# Patient Record
Sex: MALE | Race: WHITE | NOT HISPANIC OR LATINO | Employment: OTHER | URBAN - METROPOLITAN AREA
[De-identification: names, ages, dates, MRNs, and addresses within clinical notes are randomized per-mention and may not be internally consistent; named-entity substitution may affect disease eponyms.]

---

## 2017-07-28 ENCOUNTER — OFFICE VISIT (OUTPATIENT)
Dept: FAMILY MEDICINE CLINIC | Facility: CLINIC | Age: 77
End: 2017-07-28

## 2017-07-28 VITALS
DIASTOLIC BLOOD PRESSURE: 84 MMHG | OXYGEN SATURATION: 98 % | RESPIRATION RATE: 16 BRPM | WEIGHT: 187.5 LBS | SYSTOLIC BLOOD PRESSURE: 148 MMHG | HEART RATE: 100 BPM | BODY MASS INDEX: 26.25 KG/M2 | HEIGHT: 71 IN | TEMPERATURE: 98.3 F

## 2017-07-28 DIAGNOSIS — E53.8 VITAMIN B12 DEFICIENCY: ICD-10-CM

## 2017-07-28 DIAGNOSIS — I10 ESSENTIAL HYPERTENSION: Primary | ICD-10-CM

## 2017-07-28 DIAGNOSIS — E55.9 VITAMIN D DEFICIENCY: ICD-10-CM

## 2017-07-28 DIAGNOSIS — E78.00 PURE HYPERCHOLESTEROLEMIA: ICD-10-CM

## 2017-07-28 DIAGNOSIS — K21.9 GASTROESOPHAGEAL REFLUX DISEASE, ESOPHAGITIS PRESENCE NOT SPECIFIED: ICD-10-CM

## 2017-07-28 PROCEDURE — 99214 OFFICE O/P EST MOD 30 MIN: CPT | Performed by: INTERNAL MEDICINE

## 2017-07-28 RX ORDER — LISINOPRIL 20 MG/1
20 TABLET ORAL DAILY
Qty: 90 TABLET | Refills: 3 | Status: SHIPPED | OUTPATIENT
Start: 2017-07-28 | End: 2018-02-06 | Stop reason: SDUPTHER

## 2017-07-28 RX ORDER — UBIDECARENONE 75 MG
50 CAPSULE ORAL DAILY
COMMUNITY
End: 2018-10-16

## 2017-07-28 RX ORDER — SIMVASTATIN 20 MG
20 TABLET ORAL NIGHTLY
Qty: 90 TABLET | Refills: 3 | Status: SHIPPED | OUTPATIENT
Start: 2017-07-28 | End: 2018-10-16 | Stop reason: SDUPTHER

## 2017-07-28 NOTE — PROGRESS NOTES
"Subjective   Patient ID: Arjun Plaza is a 77 y.o. male presents with   Chief Complaint   Patient presents with   • Establish Care   • Sore Throat       HPI - This patient's here today to establish care.  He has a past history hypertension hyperlipidemia vitamin B12 deficiency.  Overall he's feeling well and just wishes to establish care he needs refills and blood work.  He's caught up on routine screenings such as colonoscopy he no longer does a PSA.    Assessment plan    Hypertension controlled with lisinopril 20 we'll get a CMP    Hyperlipidemia simvastatin 20 we'll get a fasting lipid profile    Mild fatigue B12 and vitamin D D deficiencies we'll check a CBC and thyroid B12 and D    Allergies   Allergen Reactions   • Diclofenac        The following portions of the patient's history were reviewed and updated as appropriate: allergies, current medications, past family history, past medical history, past social history, past surgical history and problem list.      Review of Systems   Constitutional: Positive for fatigue.   HENT: Negative.    Eyes: Negative.    Respiratory: Negative.    Cardiovascular: Negative.    Gastrointestinal: Negative.    Endocrine: Negative.    Genitourinary: Negative.    Musculoskeletal: Negative.    Skin: Negative.    Allergic/Immunologic: Negative.    Neurological: Negative.    Hematological: Negative.    Psychiatric/Behavioral: Negative.        Objective     Vitals:    07/28/17 1044   BP: 148/84   Pulse: 100   Resp: 16   Temp: 98.3 °F (36.8 °C)   TempSrc: Oral   SpO2: 98%   Weight: 187 lb 8 oz (85 kg)   Height: 71\" (180.3 cm)         Physical Exam   Constitutional: He is oriented to person, place, and time. He appears well-developed and well-nourished. No distress.   HENT:   Head: Normocephalic and atraumatic.   Eyes: Conjunctivae and EOM are normal. Pupils are equal, round, and reactive to light. Right eye exhibits no discharge. Left eye exhibits no discharge. No scleral icterus. "   Neck: Normal range of motion. Neck supple. No tracheal deviation present. No thyromegaly present.   Cardiovascular: Normal rate, regular rhythm, normal heart sounds and normal pulses.  Exam reveals no gallop.    No murmur heard.  Pulmonary/Chest: Effort normal and breath sounds normal. No respiratory distress. He has no wheezes. He has no rales.   Musculoskeletal: Normal range of motion.   Neurological: He is alert and oriented to person, place, and time.   Skin: Skin is warm. No rash noted. No erythema. No pallor.   Psychiatric: He has a normal mood and affect. His behavior is normal. Judgment and thought content normal.   Nursing note and vitals reviewed.        Arjun was seen today for establish care and sore throat.    Diagnoses and all orders for this visit:    Essential hypertension  -     Lipid Panel  -     CBC & Differential  -     Comprehensive Metabolic Panel  -     TSH+Free T4  -     Vitamin D 25 Hydroxy  -     Vitamin B12    Pure hypercholesterolemia  -     Lipid Panel  -     CBC & Differential  -     Comprehensive Metabolic Panel  -     TSH+Free T4  -     Vitamin D 25 Hydroxy  -     Vitamin B12    Vitamin B12 deficiency  -     Lipid Panel  -     CBC & Differential  -     Comprehensive Metabolic Panel  -     TSH+Free T4  -     Vitamin D 25 Hydroxy  -     Vitamin B12    Vitamin D deficiency  -     Lipid Panel  -     CBC & Differential  -     Comprehensive Metabolic Panel  -     TSH+Free T4  -     Vitamin D 25 Hydroxy  -     Vitamin B12    Gastroesophageal reflux disease, esophagitis presence not specified  -     Lipid Panel  -     CBC & Differential  -     Comprehensive Metabolic Panel  -     TSH+Free T4  -     Vitamin D 25 Hydroxy  -     Vitamin B12    Other orders  -     lisinopril (PRINIVIL,ZESTRIL) 20 MG tablet; Take 1 tablet by mouth Daily.  -     simvastatin (ZOCOR) 20 MG tablet; Take 1 tablet by mouth Every Night.        Call or return to clinic prn if these symptoms worsen or fail to improve as  anticipated.

## 2017-07-29 LAB
25(OH)D3+25(OH)D2 SERPL-MCNC: 55.4 NG/ML (ref 30–100)
ALBUMIN SERPL-MCNC: 4.6 G/DL (ref 3.5–5.2)
ALBUMIN/GLOB SERPL: 1.7 G/DL
ALP SERPL-CCNC: 71 U/L (ref 39–117)
ALT SERPL-CCNC: 17 U/L (ref 1–41)
AST SERPL-CCNC: 17 U/L (ref 1–40)
BASOPHILS # BLD AUTO: 0.02 10*3/MM3 (ref 0–0.2)
BASOPHILS NFR BLD AUTO: 0.3 % (ref 0–1.5)
BILIRUB SERPL-MCNC: 0.4 MG/DL (ref 0.1–1.2)
BUN SERPL-MCNC: 20 MG/DL (ref 8–23)
BUN/CREAT SERPL: 16.7 (ref 7–25)
CALCIUM SERPL-MCNC: 9.3 MG/DL (ref 8.6–10.5)
CHLORIDE SERPL-SCNC: 101 MMOL/L (ref 98–107)
CHOLEST SERPL-MCNC: 193 MG/DL (ref 0–200)
CO2 SERPL-SCNC: 26.8 MMOL/L (ref 22–29)
CREAT SERPL-MCNC: 1.2 MG/DL (ref 0.76–1.27)
EOSINOPHIL # BLD AUTO: 0.28 10*3/MM3 (ref 0–0.7)
EOSINOPHIL NFR BLD AUTO: 3.9 % (ref 0.3–6.2)
ERYTHROCYTE [DISTWIDTH] IN BLOOD BY AUTOMATED COUNT: 13.1 % (ref 11.5–14.5)
GLOBULIN SER CALC-MCNC: 2.7 GM/DL
GLUCOSE SERPL-MCNC: 91 MG/DL (ref 65–99)
HCT VFR BLD AUTO: 48.6 % (ref 40.4–52.2)
HDLC SERPL-MCNC: 30 MG/DL (ref 40–60)
HGB BLD-MCNC: 14.9 G/DL (ref 13.7–17.6)
IMM GRANULOCYTES # BLD: 0.02 10*3/MM3 (ref 0–0.03)
IMM GRANULOCYTES NFR BLD: 0.3 % (ref 0–0.5)
LDLC SERPL CALC-MCNC: 116 MG/DL (ref 0–100)
LYMPHOCYTES # BLD AUTO: 2.05 10*3/MM3 (ref 0.9–4.8)
LYMPHOCYTES NFR BLD AUTO: 28.6 % (ref 19.6–45.3)
MCH RBC QN AUTO: 29.2 PG (ref 27–32.7)
MCHC RBC AUTO-ENTMCNC: 30.7 G/DL (ref 32.6–36.4)
MCV RBC AUTO: 95.1 FL (ref 79.8–96.2)
MONOCYTES # BLD AUTO: 0.76 10*3/MM3 (ref 0.2–1.2)
MONOCYTES NFR BLD AUTO: 10.6 % (ref 5–12)
NEUTROPHILS # BLD AUTO: 4.03 10*3/MM3 (ref 1.9–8.1)
NEUTROPHILS NFR BLD AUTO: 56.3 % (ref 42.7–76)
PLATELET # BLD AUTO: 262 10*3/MM3 (ref 140–500)
POTASSIUM SERPL-SCNC: 5.6 MMOL/L (ref 3.5–5.2)
PROT SERPL-MCNC: 7.3 G/DL (ref 6–8.5)
RBC # BLD AUTO: 5.11 10*6/MM3 (ref 4.6–6)
SODIUM SERPL-SCNC: 141 MMOL/L (ref 136–145)
T4 FREE SERPL-MCNC: 1.47 NG/DL (ref 0.93–1.7)
TRIGL SERPL-MCNC: 236 MG/DL (ref 0–150)
TSH SERPL DL<=0.005 MIU/L-ACNC: 1.32 MIU/ML (ref 0.27–4.2)
VIT B12 SERPL-MCNC: 1888 PG/ML (ref 211–946)
VLDLC SERPL CALC-MCNC: 47.2 MG/DL (ref 5–40)
WBC # BLD AUTO: 7.16 10*3/MM3 (ref 4.5–10.7)

## 2017-07-31 DIAGNOSIS — E87.5 HIGH POTASSIUM: Primary | ICD-10-CM

## 2017-07-31 NOTE — PROGRESS NOTES
Pt informed of lab results, per his request I have mailed him these results. Labs in the computer and lab only appt has been made.

## 2017-08-15 ENCOUNTER — RESULTS ENCOUNTER (OUTPATIENT)
Dept: FAMILY MEDICINE CLINIC | Facility: CLINIC | Age: 77
End: 2017-08-15

## 2017-08-15 DIAGNOSIS — E87.5 HIGH POTASSIUM: ICD-10-CM

## 2017-08-15 LAB
BUN SERPL-MCNC: 17 MG/DL (ref 8–23)
BUN/CREAT SERPL: 14.8 (ref 7–25)
CALCIUM SERPL-MCNC: 9.6 MG/DL (ref 8.6–10.5)
CHLORIDE SERPL-SCNC: 101 MMOL/L (ref 98–107)
CO2 SERPL-SCNC: 29.3 MMOL/L (ref 22–29)
CREAT SERPL-MCNC: 1.15 MG/DL (ref 0.76–1.27)
GLUCOSE SERPL-MCNC: 103 MG/DL (ref 65–99)
POTASSIUM SERPL-SCNC: 4.6 MMOL/L (ref 3.5–5.2)
SODIUM SERPL-SCNC: 143 MMOL/L (ref 136–145)

## 2018-01-26 DIAGNOSIS — I15.9 SECONDARY HYPERTENSION: ICD-10-CM

## 2018-01-26 DIAGNOSIS — E53.8 VITAMIN B 12 DEFICIENCY: ICD-10-CM

## 2018-01-26 DIAGNOSIS — E55.9 VITAMIN D DEFICIENCY: ICD-10-CM

## 2018-01-26 DIAGNOSIS — E78.5 HYPERLIPIDEMIA, UNSPECIFIED HYPERLIPIDEMIA TYPE: Primary | ICD-10-CM

## 2018-01-30 ENCOUNTER — RESULTS ENCOUNTER (OUTPATIENT)
Dept: FAMILY MEDICINE CLINIC | Facility: CLINIC | Age: 78
End: 2018-01-30

## 2018-01-30 DIAGNOSIS — I15.9 SECONDARY HYPERTENSION: ICD-10-CM

## 2018-01-30 DIAGNOSIS — E78.5 HYPERLIPIDEMIA, UNSPECIFIED HYPERLIPIDEMIA TYPE: ICD-10-CM

## 2018-01-30 DIAGNOSIS — E55.9 VITAMIN D DEFICIENCY: ICD-10-CM

## 2018-01-30 DIAGNOSIS — E53.8 VITAMIN B 12 DEFICIENCY: ICD-10-CM

## 2018-01-30 LAB
25(OH)D3+25(OH)D2 SERPL-MCNC: 47 NG/ML (ref 30–100)
ALBUMIN SERPL-MCNC: 4.7 G/DL (ref 3.5–5.2)
ALBUMIN/GLOB SERPL: 2 G/DL
ALP SERPL-CCNC: 57 U/L (ref 39–117)
ALT SERPL-CCNC: 15 U/L (ref 1–41)
AST SERPL-CCNC: 16 U/L (ref 1–40)
BASOPHILS # BLD AUTO: 0.02 10*3/MM3 (ref 0–0.2)
BASOPHILS NFR BLD AUTO: 0.3 % (ref 0–1.5)
BILIRUB SERPL-MCNC: 0.4 MG/DL (ref 0.1–1.2)
BUN SERPL-MCNC: 33 MG/DL (ref 8–23)
BUN/CREAT SERPL: 25.6 (ref 7–25)
CALCIUM SERPL-MCNC: 9.1 MG/DL (ref 8.6–10.5)
CHLORIDE SERPL-SCNC: 99 MMOL/L (ref 98–107)
CHOLEST SERPL-MCNC: 173 MG/DL (ref 0–200)
CO2 SERPL-SCNC: 27.9 MMOL/L (ref 22–29)
CREAT SERPL-MCNC: 1.29 MG/DL (ref 0.76–1.27)
EOSINOPHIL # BLD AUTO: 0.08 10*3/MM3 (ref 0–0.7)
EOSINOPHIL NFR BLD AUTO: 1.2 % (ref 0.3–6.2)
ERYTHROCYTE [DISTWIDTH] IN BLOOD BY AUTOMATED COUNT: 13.3 % (ref 11.5–14.5)
GFR SERPLBLD CREATININE-BSD FMLA CKD-EPI: 54 ML/MIN/1.73
GFR SERPLBLD CREATININE-BSD FMLA CKD-EPI: 65 ML/MIN/1.73
GLOBULIN SER CALC-MCNC: 2.4 GM/DL
GLUCOSE SERPL-MCNC: 100 MG/DL (ref 65–99)
HCT VFR BLD AUTO: 45.6 % (ref 40.4–52.2)
HDLC SERPL-MCNC: 41 MG/DL (ref 40–60)
HGB BLD-MCNC: 14.4 G/DL (ref 13.7–17.6)
IMM GRANULOCYTES # BLD: 0.02 10*3/MM3 (ref 0–0.03)
IMM GRANULOCYTES NFR BLD: 0.3 % (ref 0–0.5)
LDLC SERPL CALC-MCNC: 111 MG/DL (ref 0–100)
LDLC/HDLC SERPL: 2.71 {RATIO}
LYMPHOCYTES # BLD AUTO: 2.02 10*3/MM3 (ref 0.9–4.8)
LYMPHOCYTES NFR BLD AUTO: 30.7 % (ref 19.6–45.3)
MCH RBC QN AUTO: 29.8 PG (ref 27–32.7)
MCHC RBC AUTO-ENTMCNC: 31.6 G/DL (ref 32.6–36.4)
MCV RBC AUTO: 94.2 FL (ref 79.8–96.2)
MONOCYTES # BLD AUTO: 0.66 10*3/MM3 (ref 0.2–1.2)
MONOCYTES NFR BLD AUTO: 10 % (ref 5–12)
NEUTROPHILS # BLD AUTO: 3.77 10*3/MM3 (ref 1.9–8.1)
NEUTROPHILS NFR BLD AUTO: 57.5 % (ref 42.7–76)
PLATELET # BLD AUTO: 212 10*3/MM3 (ref 140–500)
POTASSIUM SERPL-SCNC: 4.6 MMOL/L (ref 3.5–5.2)
PROT SERPL-MCNC: 7.1 G/DL (ref 6–8.5)
RBC # BLD AUTO: 4.84 10*6/MM3 (ref 4.6–6)
SODIUM SERPL-SCNC: 140 MMOL/L (ref 136–145)
TRIGL SERPL-MCNC: 104 MG/DL (ref 0–150)
VIT B12 SERPL-MCNC: 737 PG/ML (ref 211–946)
VLDLC SERPL CALC-MCNC: 20.8 MG/DL (ref 5–40)
WBC # BLD AUTO: 6.57 10*3/MM3 (ref 4.5–10.7)

## 2018-02-06 ENCOUNTER — OFFICE VISIT (OUTPATIENT)
Dept: FAMILY MEDICINE CLINIC | Facility: CLINIC | Age: 78
End: 2018-02-06

## 2018-02-06 VITALS
OXYGEN SATURATION: 98 % | SYSTOLIC BLOOD PRESSURE: 130 MMHG | RESPIRATION RATE: 16 BRPM | HEIGHT: 71 IN | HEART RATE: 78 BPM | DIASTOLIC BLOOD PRESSURE: 80 MMHG | TEMPERATURE: 98.2 F | WEIGHT: 190 LBS | BODY MASS INDEX: 26.6 KG/M2

## 2018-02-06 DIAGNOSIS — E55.9 VITAMIN D DEFICIENCY: ICD-10-CM

## 2018-02-06 DIAGNOSIS — R39.12 WEAK URINARY STREAM: ICD-10-CM

## 2018-02-06 DIAGNOSIS — E53.8 VITAMIN B12 DEFICIENCY: ICD-10-CM

## 2018-02-06 DIAGNOSIS — I10 ESSENTIAL HYPERTENSION: Primary | ICD-10-CM

## 2018-02-06 DIAGNOSIS — E78.00 PURE HYPERCHOLESTEROLEMIA: ICD-10-CM

## 2018-02-06 PROBLEM — N40.0 BENIGN PROSTATIC HYPERPLASIA WITHOUT LOWER URINARY TRACT SYMPTOMS: Status: ACTIVE | Noted: 2018-02-06

## 2018-02-06 LAB
SPECIMEN STATUS: NORMAL
WRITTEN AUTHORIZATION: NORMAL

## 2018-02-06 PROCEDURE — 99214 OFFICE O/P EST MOD 30 MIN: CPT | Performed by: INTERNAL MEDICINE

## 2018-02-06 RX ORDER — LISINOPRIL 20 MG/1
20 TABLET ORAL DAILY
Qty: 90 TABLET | Refills: 1 | Status: SHIPPED | OUTPATIENT
Start: 2018-02-06 | End: 2018-10-16 | Stop reason: SDUPTHER

## 2018-02-06 NOTE — PROGRESS NOTES
"Subjective   Patient ID: Arjun Plaza is a 78 y.o. male presents with   Chief Complaint   Patient presents with   • Hypertension     f/u       HPI - This patient presents today for follow-up for treatment of hypertension, hypercholesterolemia and vitamin deficiencies and a weak urinary stream.  Overall he feels good he has some common aches and pains but nothing out of the ordinary.    Assessment plan    Essential hypertension controlled with lisinopril 20 reviewed his labs labs look good    Hyperlipidemia simvastatin 20 last rales well controlled he also takes fish oil    Vitamin deficiencies continue vitamin B12 and D    Weak urinary stream we will add a PSA.    Allergies   Allergen Reactions   • Diclofenac        The following portions of the patient's history were reviewed and updated as appropriate: allergies, current medications, past family history, past medical history, past social history, past surgical history and problem list.      Review of Systems   Constitutional: Negative.    HENT: Negative.    Eyes: Negative.    Respiratory: Negative.    Cardiovascular: Negative.    Gastrointestinal: Negative.    Endocrine: Negative.    Genitourinary: Negative.    Musculoskeletal: Positive for arthralgias.   Skin: Negative.    Allergic/Immunologic: Negative.    Neurological: Negative.    Hematological: Negative.    Psychiatric/Behavioral: Negative.        Objective     Vitals:    02/06/18 0942   BP: 130/80   Pulse: 78   Resp: 16   Temp: 98.2 °F (36.8 °C)   TempSrc: Oral   SpO2: 98%   Weight: 86.2 kg (190 lb)   Height: 180.3 cm (71\")         Physical Exam   Constitutional: He is oriented to person, place, and time. He appears well-developed and well-nourished.   HENT:   Head: Normocephalic and atraumatic.   Eyes: EOM are normal. Pupils are equal, round, and reactive to light.   Cardiovascular: Normal rate, regular rhythm and normal heart sounds.    Pulmonary/Chest: Effort normal and breath sounds normal. "   Neurological: He is alert and oriented to person, place, and time.   Psychiatric: He has a normal mood and affect. His behavior is normal.   Nursing note and vitals reviewed.        Arjun was seen today for hypertension.    Diagnoses and all orders for this visit:    Essential hypertension    Pure hypercholesterolemia    Vitamin B12 deficiency    Vitamin D deficiency    Weak urinary stream        Call or return to clinic prn if these symptoms worsen or fail to improve as anticipated.

## 2018-09-11 ENCOUNTER — HOSPITAL ENCOUNTER (EMERGENCY)
Facility: HOSPITAL | Age: 78
Discharge: HOME | End: 2018-09-11
Attending: EMERGENCY MEDICINE | Admitting: EMERGENCY MEDICINE

## 2018-09-11 VITALS
HEIGHT: 70 IN | WEIGHT: 180 LBS | BODY MASS INDEX: 25.77 KG/M2 | OXYGEN SATURATION: 95 % | TEMPERATURE: 97.8 F | HEART RATE: 67 BPM | SYSTOLIC BLOOD PRESSURE: 150 MMHG | DIASTOLIC BLOOD PRESSURE: 84 MMHG | RESPIRATION RATE: 14 BRPM

## 2018-09-11 DIAGNOSIS — R10.9 ABDOMINAL CRAMPS: ICD-10-CM

## 2018-09-11 DIAGNOSIS — K92.1 BLOOD IN STOOL: ICD-10-CM

## 2018-09-11 DIAGNOSIS — R19.7 DIARRHEA, UNSPECIFIED TYPE: Primary | ICD-10-CM

## 2018-09-11 LAB
ALBUMIN SERPL-MCNC: 4 G/DL (ref 3.5–5.2)
ALBUMIN/GLOB SERPL: 1.5 G/DL
ALP SERPL-CCNC: 71 U/L (ref 40–129)
ALT SERPL W P-5'-P-CCNC: 17 U/L (ref 5–41)
ANION GAP SERPL CALCULATED.3IONS-SCNC: 9.7 MMOL/L
APTT PPP: 30.2 SECONDS (ref 24.3–38.1)
AST SERPL-CCNC: 17 U/L (ref 5–40)
BASOPHILS # BLD AUTO: 0.04 10*3/MM3 (ref 0–0.2)
BASOPHILS NFR BLD AUTO: 0.3 % (ref 0–2)
BILIRUB SERPL-MCNC: 0.5 MG/DL (ref 0.2–1.2)
BUN BLD-MCNC: 24 MG/DL (ref 8–23)
BUN/CREAT SERPL: 22 (ref 7–25)
CALCIUM SPEC-SCNC: 8.8 MG/DL (ref 8.8–10.5)
CHLORIDE SERPL-SCNC: 101 MMOL/L (ref 98–107)
CO2 SERPL-SCNC: 27.3 MMOL/L (ref 22–29)
CREAT BLD-MCNC: 1.09 MG/DL (ref 0.76–1.27)
DEPRECATED RDW RBC AUTO: 42.3 FL (ref 37–54)
EOSINOPHIL # BLD AUTO: 0.22 10*3/MM3 (ref 0.1–0.3)
EOSINOPHIL NFR BLD AUTO: 1.6 % (ref 0–4)
ERYTHROCYTE [DISTWIDTH] IN BLOOD BY AUTOMATED COUNT: 13 % (ref 11.5–14.5)
GFR SERPL CREATININE-BSD FRML MDRD: 66 ML/MIN/1.73
GLOBULIN UR ELPH-MCNC: 2.7 GM/DL
GLUCOSE BLD-MCNC: 120 MG/DL (ref 65–99)
HCT VFR BLD AUTO: 46.6 % (ref 42–52)
HGB BLD-MCNC: 15.4 G/DL (ref 14–18)
IMM GRANULOCYTES # BLD: 0.12 10*3/MM3 (ref 0–0.03)
IMM GRANULOCYTES NFR BLD: 0.9 % (ref 0–0.5)
INR PPP: 1.14 (ref 0.9–1.1)
LYMPHOCYTES # BLD AUTO: 2.31 10*3/MM3 (ref 0.6–4.8)
LYMPHOCYTES NFR BLD AUTO: 17.2 % (ref 20–45)
MCH RBC QN AUTO: 29.7 PG (ref 27–31)
MCHC RBC AUTO-ENTMCNC: 33 G/DL (ref 31–37)
MCV RBC AUTO: 89.8 FL (ref 80–94)
MONOCYTES # BLD AUTO: 0.95 10*3/MM3 (ref 0–1)
MONOCYTES NFR BLD AUTO: 7.1 % (ref 3–8)
NEUTROPHILS # BLD AUTO: 9.79 10*3/MM3 (ref 1.5–8.3)
NEUTROPHILS NFR BLD AUTO: 72.9 % (ref 45–70)
NRBC BLD MANUAL-RTO: 0 /100 WBC (ref 0–0)
PLATELET # BLD AUTO: 243 10*3/MM3 (ref 140–500)
PMV BLD AUTO: 9.1 FL (ref 7.4–10.4)
POTASSIUM BLD-SCNC: 5.1 MMOL/L (ref 3.5–5.2)
PROT SERPL-MCNC: 6.7 G/DL (ref 6–8.5)
PROTHROMBIN TIME: 14.7 SECONDS (ref 12.1–15)
RBC # BLD AUTO: 5.19 10*6/MM3 (ref 4.7–6.1)
SODIUM BLD-SCNC: 138 MMOL/L (ref 136–145)
WBC NRBC COR # BLD: 13.43 10*3/MM3 (ref 4.8–10.8)

## 2018-09-11 PROCEDURE — 85610 PROTHROMBIN TIME: CPT | Performed by: EMERGENCY MEDICINE

## 2018-09-11 PROCEDURE — 99283 EMERGENCY DEPT VISIT LOW MDM: CPT

## 2018-09-11 PROCEDURE — 85025 COMPLETE CBC W/AUTO DIFF WBC: CPT | Performed by: EMERGENCY MEDICINE

## 2018-09-11 PROCEDURE — 99284 EMERGENCY DEPT VISIT MOD MDM: CPT | Performed by: EMERGENCY MEDICINE

## 2018-09-11 PROCEDURE — 85730 THROMBOPLASTIN TIME PARTIAL: CPT | Performed by: EMERGENCY MEDICINE

## 2018-09-11 PROCEDURE — 80053 COMPREHEN METABOLIC PANEL: CPT | Performed by: EMERGENCY MEDICINE

## 2018-09-11 PROCEDURE — 96360 HYDRATION IV INFUSION INIT: CPT

## 2018-09-11 RX ORDER — CEFUROXIME AXETIL 500 MG/1
500 TABLET ORAL 2 TIMES DAILY
COMMUNITY
End: 2018-10-16

## 2018-09-11 RX ORDER — LOPERAMIDE HYDROCHLORIDE 2 MG/1
4 CAPSULE ORAL ONCE
Status: COMPLETED | OUTPATIENT
Start: 2018-09-11 | End: 2018-09-11

## 2018-09-11 RX ORDER — SODIUM CHLORIDE 0.9 % (FLUSH) 0.9 %
10 SYRINGE (ML) INJECTION AS NEEDED
Status: DISCONTINUED | OUTPATIENT
Start: 2018-09-11 | End: 2018-09-11 | Stop reason: HOSPADM

## 2018-09-11 RX ADMIN — LOPERAMIDE HYDROCHLORIDE 4 MG: 2 CAPSULE ORAL at 04:00

## 2018-09-11 RX ADMIN — SODIUM CHLORIDE 1000 ML: 9 INJECTION, SOLUTION INTRAVENOUS at 04:00

## 2018-09-11 NOTE — ED PROVIDER NOTES
"Subjective   History of Present Illness  History of Present Illness    Chief complaint: Diarrhea, blood in stool    Location: GI    Quality/Severity:  Moderate symptoms    Timing/Duration: Began around 6 PM, continued through the evening    Modifying Factors: None    Narrative: This patient presents for evaluation of new onset diarrhea with blood in the stools.  He says that he and his wife had supper around 4 PM.  He ate some chicken noodle Soup and a salad.  His wife had the same chicken noodle soup but she did not have the salad.  Then, about 2 hours later he developed some severe abdominal cramping feeling and the urgent need to move his bowels.  When he went to the bathroom, sitting on the commode, he felt very hot and sweaty and eventually had some significant diarrheal stools.  There was no blood in that initial episode.  He did not have any nausea or vomiting.  After he had finished moving his bowels, he was able to get up and rest for a while.  Then a couple of hours later, he had another episode with more abdominal cramping and some diarrhea but this concerned him because when he wiped he saw some blood on the toilet paper and in the commode with the loose stool.  Additionally, he said it felt very painful in the rectum area when he was passing the diarrheal stool.  He said \"it felt like I was passing glass.\"  Then, once more around 3:00 this morning he had another episode with some blood in the diarrheal stools again.  He continues having some mild abdominal crampy pains throughout his abdomen.  He still has not had any vomiting.  He denies any fevers.  His wife has been feeling well without any symptoms tonight.  There are no other known sick contacts.  The patient takes an aspirin tablet regularly but no other anticoagulation medicines.  He took some Kaopectate tablets earlier at home but they have not relieved any of his symptoms so far.    Associated Symptoms: As above    Review of Systems "   Constitutional: Negative for activity change, fatigue and fever.   HENT: Negative.    Respiratory: Negative for cough and shortness of breath.    Cardiovascular: Negative for chest pain.   Gastrointestinal: Positive for abdominal pain, blood in stool, diarrhea and rectal pain. Negative for vomiting.   Genitourinary: Negative for dysuria, flank pain, frequency and hematuria.   Musculoskeletal: Negative for myalgias.   Skin: Negative for color change and rash.   Neurological: Negative for syncope and headaches.   All other systems reviewed and are negative.      Past Medical History:   Diagnosis Date   • Hyperlipidemia    • Hypertension        Allergies   Allergen Reactions   • Diclofenac        Past Surgical History:   Procedure Laterality Date   • FOREARM SURGERY     • HAND SURGERY Right        Family History   Problem Relation Age of Onset   • Hypertension Father    • Diabetes Father    • Diabetes Sister        Social History     Social History   • Marital status: Single     Social History Main Topics   • Smoking status: Never Smoker   • Smokeless tobacco: Never Used   • Alcohol use No   • Drug use: No   • Sexual activity: Defer     Other Topics Concern   • Not on file       ED Triage Vitals [09/11/18 0334]   Temp Heart Rate Resp BP SpO2   97.9 °F (36.6 °C) 76 18 171/96 95 %      Temp src Heart Rate Source Patient Position BP Location FiO2 (%)   Oral Monitor Lying Left arm --         Objective   Physical Exam   Constitutional: He is oriented to person, place, and time. He appears well-developed and well-nourished. No distress.   HENT:   Head: Normocephalic and atraumatic.   Right Ear: External ear normal.   Left Ear: External ear normal.   Eyes: Pupils are equal, round, and reactive to light. EOM are normal. Right eye exhibits no discharge. Left eye exhibits no discharge.   Neck: Normal range of motion. Neck supple. No tracheal deviation present.   Cardiovascular: Normal rate, regular rhythm, normal heart sounds  and intact distal pulses.  Exam reveals no gallop and no friction rub.    No murmur heard.  Pulmonary/Chest: Effort normal. No respiratory distress. He has no wheezes. He has no rales.   Abdominal: Soft. He exhibits no distension and no mass. There is no tenderness. There is no rebound and no guarding. No hernia.   No specific areas of tenderness on palpation.  No peritoneal signs anywhere.  McBurney's point is negative.  Bowel sounds are present and normal throughout.   Genitourinary: Rectal exam shows guaiac positive stool.   Genitourinary Comments: The rectum demonstrates normal tone and no masses or fissures or hemorrhoids.  Hemoccult exam is positive for blood.   Musculoskeletal: Normal range of motion. He exhibits no edema or deformity.   Neurological: He is alert and oriented to person, place, and time. He exhibits normal muscle tone. Coordination normal.   Skin: Skin is warm and dry. No rash noted. He is not diaphoretic. No erythema. No pallor.   Psychiatric: He has a normal mood and affect. His behavior is normal. Judgment and thought content normal.   Nursing note and vitals reviewed.    Results for orders placed or performed during the hospital encounter of 09/11/18   Comprehensive Metabolic Panel   Result Value Ref Range    Glucose 120 (H) 65 - 99 mg/dL    BUN 24 (H) 8 - 23 mg/dL    Creatinine 1.09 0.76 - 1.27 mg/dL    Sodium 138 136 - 145 mmol/L    Potassium 5.1 3.5 - 5.2 mmol/L    Chloride 101 98 - 107 mmol/L    CO2 27.3 22.0 - 29.0 mmol/L    Calcium 8.8 8.8 - 10.5 mg/dL    Total Protein 6.7 6.0 - 8.5 g/dL    Albumin 4.00 3.50 - 5.20 g/dL    ALT (SGPT) 17 5 - 41 U/L    AST (SGOT) 17 5 - 40 U/L    Alkaline Phosphatase 71 40 - 129 U/L    Total Bilirubin 0.5 0.2 - 1.2 mg/dL    eGFR Non African Amer 66 >60 mL/min/1.73    Globulin 2.7 gm/dL    A/G Ratio 1.5 g/dL    BUN/Creatinine Ratio 22.0 7.0 - 25.0    Anion Gap 9.7 mmol/L   Protime-INR   Result Value Ref Range    Protime 14.7 12.1 - 15.0 Seconds    INR  1.14 (H) 0.90 - 1.10   aPTT   Result Value Ref Range    PTT 30.2 24.3 - 38.1 seconds   CBC Auto Differential   Result Value Ref Range    WBC 13.43 (H) 4.80 - 10.80 10*3/mm3    RBC 5.19 4.70 - 6.10 10*6/mm3    Hemoglobin 15.4 14.0 - 18.0 g/dL    Hematocrit 46.6 42.0 - 52.0 %    MCV 89.8 80.0 - 94.0 fL    MCH 29.7 27.0 - 31.0 pg    MCHC 33.0 31.0 - 37.0 g/dL    RDW 13.0 11.5 - 14.5 %    RDW-SD 42.3 37.0 - 54.0 fl    MPV 9.1 7.4 - 10.4 fL    Platelets 243 140 - 500 10*3/mm3    Neutrophil % 72.9 (H) 45.0 - 70.0 %    Lymphocyte % 17.2 (L) 20.0 - 45.0 %    Monocyte % 7.1 3.0 - 8.0 %    Eosinophil % 1.6 0.0 - 4.0 %    Basophil % 0.3 0.0 - 2.0 %    Immature Grans % 0.9 (H) 0.0 - 0.5 %    Neutrophils, Absolute 9.79 (H) 1.50 - 8.30 10*3/mm3    Lymphocytes, Absolute 2.31 0.60 - 4.80 10*3/mm3    Monocytes, Absolute 0.95 0.00 - 1.00 10*3/mm3    Eosinophils, Absolute 0.22 0.10 - 0.30 10*3/mm3    Basophils, Absolute 0.04 0.00 - 0.20 10*3/mm3    Immature Grans, Absolute 0.12 (H) 0.00 - 0.03 10*3/mm3    nRBC 0.0 0.0 - 0.0 /100 WBC       Procedures           ED Course  ED Course as of Sep 11 0515   Tue Sep 11, 2018   0514 I have reviewed all the labs from today's visit.  They're quite reassuring in general.  I just completed a repeat exam of the patient.  His abdomen is still entirely benign, soft and nontender throughout all 4 quadrants.  The patient has had no episodes of vomiting at all tonight.  He has not had any diarrheal episodes in the nearly 2 hours and he is been in the department here as well.  It seems like this was some form of an infectious diarrheal illness that came on rather quickly tonight.  I expect that it should be self-limiting.  The patient's vital signs are entirely normal.  He is not anticoagulated.  I see no need to keep him any longer or conduct any further tests at this time.  I had a lengthy discussion with the patient and his wife at bedside regarding disposition plan.  I explained that he should return  immediately if his diarrhea or pain or bleeding seemed to be worsening.  I also advised him to follow up at local GI office if his symptoms don't completely resolve within the next 24-48 hours.  He agreed to this plan as outlined.  Patient discharged home in good condition after that.  [LINDA]      ED Course User Index  [LINDA] Ever Barrera MD                  MDM  Number of Diagnoses or Management Options  Abdominal cramps:   Blood in stool:   Diarrhea, unspecified type:      Amount and/or Complexity of Data Reviewed  Clinical lab tests: reviewed and ordered  Decide to obtain previous medical records or to obtain history from someone other than the patient: yes  Review and summarize past medical records: yes          Final diagnoses:   Diarrhea, unspecified type   Blood in stool   Abdominal cramps            Ever Barrera MD  09/11/18 3393

## 2018-09-11 NOTE — DISCHARGE INSTRUCTIONS
Gentle diet and plenty of fluids today as tolerated.  Please return to the emergency room for any worsening abdominal pain, diarrhea, bleeding, weakness, dizziness or any other concerns.  Follow up with the GI doctor as we discussed for further evaluation of your symptoms if they do not completely resolve.

## 2018-09-13 ENCOUNTER — OFFICE VISIT (OUTPATIENT)
Dept: GASTROENTEROLOGY | Facility: CLINIC | Age: 78
End: 2018-09-13

## 2018-09-13 VITALS
SYSTOLIC BLOOD PRESSURE: 132 MMHG | BODY MASS INDEX: 26.05 KG/M2 | WEIGHT: 182 LBS | HEIGHT: 70 IN | DIASTOLIC BLOOD PRESSURE: 72 MMHG

## 2018-09-13 DIAGNOSIS — K62.5 RECTAL BLEEDING: Primary | ICD-10-CM

## 2018-09-13 PROCEDURE — 99214 OFFICE O/P EST MOD 30 MIN: CPT | Performed by: INTERNAL MEDICINE

## 2018-09-13 NOTE — PROGRESS NOTES
PATIENT INFORMATION  Arjun Plaza       - 1940    CHIEF COMPLAINT  Chief Complaint   Patient presents with   • Rectal Bleeding   • Diarrhea       HISTORY OF PRESENT ILLNESS  Has seen Dr Corea and had a colon in  but only has heartburn and denies an EGD in the past and started on his wifes Prilosec OTC daily with good response.    3 Days prior had post prandial cramping and sweats leading to a BM that was painful and sharp like glass. Followed by persistant cramps and blood on TP only.    Didn't go for a day or so but has resumed and also just finished a round of ABX for URI            REVIEW OF SYSTEMS  Review of Systems   HENT: Positive for hearing loss.    Gastrointestinal: Positive for anal bleeding, blood in stool and diarrhea.         ACTIVE PROBLEMS  Patient Active Problem List    Diagnosis   • Benign prostatic hyperplasia without lower urinary tract symptoms [N40.0]   • Weak urinary stream [R39.12]   • Vitamin B12 deficiency [E53.8]   • Vitamin D deficiency [E55.9]   • Gastroesophageal reflux disease [K21.9]   • Paroxysmal SVT (supraventricular tachycardia) (CMS/HCC) [I47.1]   • Hyperlipemia [E78.5]   • Essential hypertension [I10]         PAST MEDICAL HISTORY  Past Medical History:   Diagnosis Date   • Hyperlipidemia    • Hypertension          SURGICAL HISTORY  Past Surgical History:   Procedure Laterality Date   • COLONOSCOPY     • FOREARM SURGERY     • HAND SURGERY Right          FAMILY HISTORY  Family History   Problem Relation Age of Onset   • Hypertension Father    • Diabetes Father    • Diabetes Sister    • Colon cancer Neg Hx    • Colon polyps Neg Hx          SOCIAL HISTORY  Social History     Occupational History   • Not on file.     Social History Main Topics   • Smoking status: Never Smoker   • Smokeless tobacco: Never Used   • Alcohol use No   • Drug use: No   • Sexual activity: Defer         CURRENT MEDICATIONS    Current Outpatient Prescriptions:   •  aspirin 81 MG EC  "tablet, Take 81 mg by mouth daily., Disp: , Rfl:   •  cefuroxime (CEFTIN) 500 MG tablet, Take 500 mg by mouth 2 (Two) Times a Day. Pt finished taking 2 days ago, Disp: , Rfl:   •  lisinopril (PRINIVIL,ZESTRIL) 20 MG tablet, Take 1 tablet by mouth Daily., Disp: 90 tablet, Rfl: 1  •  niacin 500 MG tablet, Take 500 mg by mouth every night., Disp: , Rfl:   •  Omega-3 Fatty Acids (FISH OIL) 1000 MG capsule capsule, Take  by mouth daily with breakfast., Disp: , Rfl:   •  omeprazole (PriLOSEC) 20 MG capsule, Take 20 mg by mouth daily., Disp: , Rfl:   •  simvastatin (ZOCOR) 20 MG tablet, Take 1 tablet by mouth Every Night., Disp: 90 tablet, Rfl: 3  •  vitamin B-12 (CYANOCOBALAMIN) 100 MCG tablet, Take 50 mcg by mouth Daily., Disp: , Rfl:     ALLERGIES  Diclofenac    VITALS  Vitals:    09/13/18 1102   BP: 132/72   Weight: 82.6 kg (182 lb)   Height: 177 cm (69.69\")       LAST RESULTS   Admission on 09/11/2018, Discharged on 09/11/2018   Component Date Value Ref Range Status   • Glucose 09/11/2018 120* 65 - 99 mg/dL Final   • BUN 09/11/2018 24* 8 - 23 mg/dL Final   • Creatinine 09/11/2018 1.09  0.76 - 1.27 mg/dL Final   • Sodium 09/11/2018 138  136 - 145 mmol/L Final   • Potassium 09/11/2018 5.1  3.5 - 5.2 mmol/L Final   • Chloride 09/11/2018 101  98 - 107 mmol/L Final   • CO2 09/11/2018 27.3  22.0 - 29.0 mmol/L Final   • Calcium 09/11/2018 8.8  8.8 - 10.5 mg/dL Final   • Total Protein 09/11/2018 6.7  6.0 - 8.5 g/dL Final   • Albumin 09/11/2018 4.00  3.50 - 5.20 g/dL Final   • ALT (SGPT) 09/11/2018 17  5 - 41 U/L Final   • AST (SGOT) 09/11/2018 17  5 - 40 U/L Final   • Alkaline Phosphatase 09/11/2018 71  40 - 129 U/L Final   • Total Bilirubin 09/11/2018 0.5  0.2 - 1.2 mg/dL Final   • eGFR Non  Amer 09/11/2018 66  >60 mL/min/1.73 Final   • Globulin 09/11/2018 2.7  gm/dL Final   • A/G Ratio 09/11/2018 1.5  g/dL Final   • BUN/Creatinine Ratio 09/11/2018 22.0  7.0 - 25.0 Final   • Anion Gap 09/11/2018 9.7  mmol/L Final   • " Protime 09/11/2018 14.7  12.1 - 15.0 Seconds Final   • INR 09/11/2018 1.14* 0.90 - 1.10 Final   • PTT 09/11/2018 30.2  24.3 - 38.1 seconds Final   • WBC 09/11/2018 13.43* 4.80 - 10.80 10*3/mm3 Final   • RBC 09/11/2018 5.19  4.70 - 6.10 10*6/mm3 Final   • Hemoglobin 09/11/2018 15.4  14.0 - 18.0 g/dL Final   • Hematocrit 09/11/2018 46.6  42.0 - 52.0 % Final   • MCV 09/11/2018 89.8  80.0 - 94.0 fL Final   • MCH 09/11/2018 29.7  27.0 - 31.0 pg Final   • MCHC 09/11/2018 33.0  31.0 - 37.0 g/dL Final   • RDW 09/11/2018 13.0  11.5 - 14.5 % Final   • RDW-SD 09/11/2018 42.3  37.0 - 54.0 fl Final   • MPV 09/11/2018 9.1  7.4 - 10.4 fL Final   • Platelets 09/11/2018 243  140 - 500 10*3/mm3 Final   • Neutrophil % 09/11/2018 72.9* 45.0 - 70.0 % Final   • Lymphocyte % 09/11/2018 17.2* 20.0 - 45.0 % Final   • Monocyte % 09/11/2018 7.1  3.0 - 8.0 % Final   • Eosinophil % 09/11/2018 1.6  0.0 - 4.0 % Final   • Basophil % 09/11/2018 0.3  0.0 - 2.0 % Final   • Immature Grans % 09/11/2018 0.9* 0.0 - 0.5 % Final   • Neutrophils, Absolute 09/11/2018 9.79* 1.50 - 8.30 10*3/mm3 Final   • Lymphocytes, Absolute 09/11/2018 2.31  0.60 - 4.80 10*3/mm3 Final   • Monocytes, Absolute 09/11/2018 0.95  0.00 - 1.00 10*3/mm3 Final   • Eosinophils, Absolute 09/11/2018 0.22  0.10 - 0.30 10*3/mm3 Final   • Basophils, Absolute 09/11/2018 0.04  0.00 - 0.20 10*3/mm3 Final   • Immature Grans, Absolute 09/11/2018 0.12* 0.00 - 0.03 10*3/mm3 Final   • nRBC 09/11/2018 0.0  0.0 - 0.0 /100 WBC Final     No results found.    PHYSICAL EXAM  Physical Exam   Constitutional: He is oriented to person, place, and time. He appears well-developed and well-nourished.   HENT:   Head: Normocephalic and atraumatic.   Eyes: Pupils are equal, round, and reactive to light. Conjunctivae and EOM are normal. No scleral icterus.   Neck: Normal range of motion. Neck supple. No thyromegaly present.   Cardiovascular: Normal rate, regular rhythm, normal heart sounds and intact distal  pulses.  Exam reveals no gallop.    No murmur heard.  Pulmonary/Chest: Effort normal and breath sounds normal. He has no wheezes. He has no rales.   Abdominal: Soft. Bowel sounds are normal. He exhibits no shifting dullness, no distension, no fluid wave, no abdominal bruit, no ascites and no mass. There is no hepatosplenomegaly. There is no tenderness. There is no guarding and negative Hernandez's sign. Hernia confirmed negative in the ventral area.   Musculoskeletal: Normal range of motion. He exhibits no edema.   Lymphadenopathy:     He has no cervical adenopathy.   Neurological: He is alert and oriented to person, place, and time.   Skin: Skin is warm and dry. No rash noted. He is not diaphoretic. No erythema.   Psychiatric: He has a normal mood and affect. His behavior is normal.       ASSESSMENT  Diagnoses and all orders for this visit:    Rectal bleeding  -     Case Request; Standing  -     Case Request    Other orders  -     Follow Anesthesia Guidelines / Standing Orders; Future  -     Obtain informed consent; Standing  -     Verify bowel prep was successful; Standing  -     Give tap water enema if bowel prep was insufficient; Standing          PLAN  Return if symptoms worsen or fail to improve.

## 2018-09-18 ENCOUNTER — PREP FOR SURGERY (OUTPATIENT)
Dept: OTHER | Facility: HOSPITAL | Age: 78
End: 2018-09-18

## 2018-09-18 DIAGNOSIS — K62.5 RECTAL BLEEDING: Primary | ICD-10-CM

## 2018-10-16 ENCOUNTER — OFFICE VISIT (OUTPATIENT)
Dept: FAMILY MEDICINE CLINIC | Facility: CLINIC | Age: 78
End: 2018-10-16

## 2018-10-16 VITALS
HEIGHT: 70 IN | BODY MASS INDEX: 26.51 KG/M2 | HEART RATE: 77 BPM | DIASTOLIC BLOOD PRESSURE: 76 MMHG | SYSTOLIC BLOOD PRESSURE: 148 MMHG | TEMPERATURE: 97.2 F | OXYGEN SATURATION: 97 % | WEIGHT: 185.2 LBS

## 2018-10-16 DIAGNOSIS — K21.9 GASTROESOPHAGEAL REFLUX DISEASE, ESOPHAGITIS PRESENCE NOT SPECIFIED: ICD-10-CM

## 2018-10-16 DIAGNOSIS — E53.8 VITAMIN B12 DEFICIENCY: ICD-10-CM

## 2018-10-16 DIAGNOSIS — Z00.00 MEDICARE ANNUAL WELLNESS VISIT, SUBSEQUENT: Primary | ICD-10-CM

## 2018-10-16 DIAGNOSIS — I10 ESSENTIAL HYPERTENSION: ICD-10-CM

## 2018-10-16 DIAGNOSIS — E78.00 PURE HYPERCHOLESTEROLEMIA: ICD-10-CM

## 2018-10-16 DIAGNOSIS — E55.9 VITAMIN D DEFICIENCY: ICD-10-CM

## 2018-10-16 PROBLEM — R39.12 WEAK URINARY STREAM: Status: RESOLVED | Noted: 2018-02-06 | Resolved: 2018-10-16

## 2018-10-16 PROBLEM — N40.0 BENIGN PROSTATIC HYPERPLASIA WITHOUT LOWER URINARY TRACT SYMPTOMS: Status: RESOLVED | Noted: 2018-02-06 | Resolved: 2018-10-16

## 2018-10-16 PROCEDURE — 99213 OFFICE O/P EST LOW 20 MIN: CPT | Performed by: FAMILY MEDICINE

## 2018-10-16 PROCEDURE — G0439 PPPS, SUBSEQ VISIT: HCPCS | Performed by: FAMILY MEDICINE

## 2018-10-16 PROCEDURE — 96160 PT-FOCUSED HLTH RISK ASSMT: CPT | Performed by: FAMILY MEDICINE

## 2018-10-16 RX ORDER — LISINOPRIL 20 MG/1
20 TABLET ORAL DAILY
Qty: 90 TABLET | Refills: 1 | Status: SHIPPED | OUTPATIENT
Start: 2018-10-16 | End: 2019-04-30 | Stop reason: SDUPTHER

## 2018-10-16 RX ORDER — LANOLIN ALCOHOL/MO/W.PET/CERES
1000 CREAM (GRAM) TOPICAL DAILY
COMMUNITY
End: 2021-06-01

## 2018-10-16 RX ORDER — MELATONIN
1000 DAILY
COMMUNITY

## 2018-10-16 RX ORDER — SIMVASTATIN 20 MG
20 TABLET ORAL NIGHTLY
Qty: 90 TABLET | Refills: 1 | Status: SHIPPED | OUTPATIENT
Start: 2018-10-16 | End: 2019-04-30 | Stop reason: SDUPTHER

## 2018-10-16 NOTE — PROGRESS NOTES
QUICK REFERENCE INFORMATION:  The ABCs of the Annual Wellness Visit    Subsequent Medicare Wellness Visit    HEALTH RISK ASSESSMENT    1940    Recent Hospitalizations:  No hospitalization(s) within the last year..        Current Medical Providers:  Patient Care Team:  Delon Reynolds MD as PCP - General (Family Medicine)  John Larson MD as PCP - Family Medicine        Smoking Status:  History   Smoking Status   • Never Smoker   Smokeless Tobacco   • Never Used       Alcohol Consumption:  History   Alcohol Use No       Depression Screen:   PHQ-2/PHQ-9 Depression Screening 10/16/2018   Little interest or pleasure in doing things 0   Feeling down, depressed, or hopeless 0   Total Score 0       Health Habits and Functional and Cognitive Screening:  Functional & Cognitive Status 10/16/2018   Do you have difficulty preparing food and eating? No   Do you have difficulty bathing yourself, getting dressed or grooming yourself? No   Do you have difficulty using the toilet? No   Do you have difficulty moving around from place to place? No   Do you have trouble with steps or getting out of a bed or a chair? No   In the past year have you fallen or experienced a near fall? No   Current Diet Well Balanced Diet   Dental Exam Up to date   Eye Exam Up to date   Do you need help using the phone?  No   Are you deaf or do you have serious difficulty hearing?  Yes   Do you need help with transportation? No   Do you need help shopping? No   Do you need help preparing meals?  No   Do you need help with housework?  No   Do you need help with laundry? No   Do you need help taking your medications? No   Do you need help managing money? No   Do you ever drive or ride in a car without wearing a seat belt? No   Have you felt unusual stress, anger or loneliness in the last month? No   Who do you live with? Spouse   If you need help, do you have trouble finding someone available to you? No   Have you been bothered in the last four  weeks by sexual problems? No   Do you have difficulty concentrating, remembering or making decisions? Yes           Does the patient have evidence of cognitive impairment? No    Aspirin use counseling: Taking ASA appropriately as indicated      Recent Lab Results:  CMP:  Lab Results   Component Value Date     (H) 01/30/2018    BUN 24 (H) 09/11/2018    CREATININE 1.09 09/11/2018    EGFRIFNONA 66 09/11/2018    EGFRIFAFRI 65 01/30/2018    BCR 22.0 09/11/2018     09/11/2018    K 5.1 09/11/2018    CO2 27.3 09/11/2018    CALCIUM 8.8 09/11/2018    PROTENTOTREF 7.1 01/30/2018    ALBUMIN 4.00 09/11/2018    LABGLOBREF 2.4 01/30/2018    LABIL2 2.0 01/30/2018    BILITOT 0.5 09/11/2018    ALKPHOS 71 09/11/2018    AST 17 09/11/2018    ALT 17 09/11/2018     Lipid Panel:  Lab Results   Component Value Date    TRIG 104 01/30/2018    HDL 41 01/30/2018    VLDL 20.8 01/30/2018    LDLHDL 2.71 01/30/2018     HbA1c:       Visual Acuity:  No exam data present    Age-appropriate Screening Schedule:  Refer to the list below for future screening recommendations based on patient's age, sex and/or medical conditions. Orders for these recommended tests are listed in the plan section. The patient has been provided with a written plan.    Health Maintenance   Topic Date Due   • ZOSTER VACCINE (2 of 2) 09/22/2017   • INFLUENZA VACCINE  08/01/2018   • LIPID PANEL  01/30/2019   • COLONOSCOPY  05/01/2024   • TDAP/TD VACCINES (2 - Td) 07/28/2027   • PNEUMOCOCCAL VACCINES (65+ LOW/MEDIUM RISK)  Addressed        Immunization History   Administered Date(s) Administered   • Pneumococcal Conjugate 13-Valent (PCV13) 06/28/2016   • Pneumococcal Polysaccharide (PPSV23) 01/12/2009         Subjective   History of Present Illness    Arjun Plaza is a 78 y.o. male who presents for an Subsequent Wellness Visit.    The following portions of the patient's history were reviewed and updated as appropriate: allergies, current medications, past family  "history, past medical history, past social history, past surgical history and problem list.    Outpatient Medications Prior to Visit   Medication Sig Dispense Refill   • aspirin 81 MG EC tablet Take 81 mg by mouth daily.     • Omega-3 Fatty Acids (FISH OIL) 1000 MG capsule capsule Take  by mouth daily with breakfast.     • omeprazole (PriLOSEC) 20 MG capsule Take 20 mg by mouth daily.     • lisinopril (PRINIVIL,ZESTRIL) 20 MG tablet Take 1 tablet by mouth Daily. 90 tablet 1   • niacin 500 MG tablet Take 500 mg by mouth every night.     • simvastatin (ZOCOR) 20 MG tablet Take 1 tablet by mouth Every Night. 90 tablet 3   • vitamin B-12 (CYANOCOBALAMIN) 100 MCG tablet Take 50 mcg by mouth Daily.     • cefuroxime (CEFTIN) 500 MG tablet Take 500 mg by mouth 2 (Two) Times a Day. Pt finished taking 2 days ago       No facility-administered medications prior to visit.        Patient Active Problem List   Diagnosis   • Pure hypercholesterolemia   • Essential hypertension   • Vitamin B12 deficiency   • Vitamin D deficiency   • Gastroesophageal reflux disease       Advance Care Planning:  has an advance directive - a copy HAS NOT been provided. Have asked the patient to send this to us to add to record.    Identification of Risk Factors:  Risk factors include: cardiovascular risk.    Review of Systems    Compared to one year ago, the patient feels his physical health is the same.  Compared to one year ago, the patient feels his mental health is the same.    Objective     Physical Exam    Vitals:    10/16/18 1035   BP: 148/76   Pulse: 77   Temp: 97.2 °F (36.2 °C)   TempSrc: Oral   SpO2: 97%   Weight: 84 kg (185 lb 3.2 oz)   Height: 177 cm (69.69\")       Patient's Body mass index is 26.81 kg/m². BMI is within normal parameters. No follow-up required.      Assessment/Plan   Patient Self-Management and Personalized Health Advice  The patient has been provided with information about: exercise, prevention of cardiac or vascular " disease and designing advance directives and preventive services including:   · Advance directive, patient refused flu vaccine.  · Repeat colonoscopy scheduled with his GI doctor  · Recommend shingles vaccination, hepatitis A, and TdaP through retail pharmacy.    Visit Diagnoses:    ICD-10-CM ICD-9-CM   1. Medicare annual wellness visit, subsequent Z00.00 V70.0   2. Pure hypercholesterolemia E78.00 272.0   3. Essential hypertension I10 401.9   4. Vitamin D deficiency E55.9 268.9   5. Vitamin B12 deficiency E53.8 266.2   6. Gastroesophageal reflux disease, esophagitis presence not specified K21.9 530.81       Orders Placed This Encounter   Procedures   • Comprehensive Metabolic Panel   • Lipid Panel   • Vitamin B12   • Vitamin D 1,25 Dihydroxy   • CBC & Differential     Order Specific Question:   Manual Differential     Answer:   No       Outpatient Encounter Prescriptions as of 10/16/2018   Medication Sig Dispense Refill   • aspirin 81 MG EC tablet Take 81 mg by mouth daily.     • cholecalciferol (VITAMIN D3) 1000 units tablet Take 1,000 Units by mouth Daily.     • lisinopril (PRINIVIL,ZESTRIL) 20 MG tablet Take 1 tablet by mouth Daily. 90 tablet 1   • Omega-3 Fatty Acids (FISH OIL) 1000 MG capsule capsule Take  by mouth daily with breakfast.     • omeprazole (PriLOSEC) 20 MG capsule Take 20 mg by mouth daily.     • simvastatin (ZOCOR) 20 MG tablet Take 1 tablet by mouth Every Night. 90 tablet 1   • vitamin B-12 (CYANOCOBALAMIN) 1000 MCG tablet Take 1,000 mcg by mouth Daily.     • [DISCONTINUED] lisinopril (PRINIVIL,ZESTRIL) 20 MG tablet Take 1 tablet by mouth Daily. 90 tablet 1   • [DISCONTINUED] niacin 500 MG tablet Take 500 mg by mouth every night.     • [DISCONTINUED] simvastatin (ZOCOR) 20 MG tablet Take 1 tablet by mouth Every Night. 90 tablet 3   • [DISCONTINUED] vitamin B-12 (CYANOCOBALAMIN) 100 MCG tablet Take 50 mcg by mouth Daily.     • [DISCONTINUED] cefuroxime (CEFTIN) 500 MG tablet Take 500 mg by mouth  2 (Two) Times a Day. Pt finished taking 2 days ago       No facility-administered encounter medications on file as of 10/16/2018.        Reviewed use of high risk medication in the elderly: yes  Reviewed for potential of harmful drug interactions in the elderly: yes    Follow Up:  No Follow-up on file.     An After Visit Summary and PPPS with all of these plans were given to the patient.

## 2018-10-16 NOTE — PROGRESS NOTES
"Subjective   Arjun Plaza is a 78 y.o. male.     Establish Care (Transfer Dr. Cates pt is fasting ); Hyperlipidemia; Hypertension; Vitamin D Deficiency; and Annual Exam (AWV)    History of Present Illness    Hypertension follow up. Doing well with current medication which he is taking as directed. No known high or low blood pressure episodes. No cardiovascular or neurological symptoms. Today's BP: 148/76.      Hyperlipidemia follow up. He is taking statin medication without complaint. No myopathy symptoms.     Last lipid panel:   Lab Results   Component Value Date    HDL 41 01/30/2018     Lab Results   Component Value Date     (H) 01/30/2018     Lab Results   Component Value Date    TRIG 104 01/30/2018     Reported vitamin B12 and vitamin D deficiency.  He's been on supplementation for number of years.  He states the levels have been unchanged in 10 years.  His B12 levels actually high 2 years ago.  It was normal earlier this year.    GERD.  He takes omeprazole.  He gets occasional heartburn.    The following portions of the patient's history were reviewed and updated as appropriate: allergies, current medications, past family history, past medical history, past social history, past surgical history and problem list.      Review of Systems   Constitutional: Negative.    Respiratory: Negative.    Cardiovascular: Negative.    Musculoskeletal: Negative.    Neurological: Negative.    Psychiatric/Behavioral: Negative.        Objective   Blood pressure 148/76, pulse 77, temperature 97.2 °F (36.2 °C), temperature source Oral, height 177 cm (69.69\"), weight 84 kg (185 lb 3.2 oz), SpO2 97 %.  Physical Exam   Constitutional: He appears well-developed and well-nourished. No distress.   Neck: No thyromegaly present.   Cardiovascular: Normal rate, regular rhythm, normal heart sounds and intact distal pulses.    Pulmonary/Chest: Effort normal and breath sounds normal.   Musculoskeletal: He exhibits no edema.   Skin: " Skin is warm and dry.   Psychiatric: He has a normal mood and affect. His behavior is normal. Judgment and thought content normal.   Nursing note and vitals reviewed.      Assessment/Plan   Arjun was seen today for establish care, hyperlipidemia, hypertension, vitamin d deficiency and annual exam.    Diagnoses and all orders for this visit:    Medicare annual wellness visit, subsequent    Pure hypercholesterolemia  -     Comprehensive Metabolic Panel  -     Lipid Panel    Essential hypertension  -     Comprehensive Metabolic Panel  -     Lipid Panel    Vitamin D deficiency  -     Vitamin D 1,25 Dihydroxy    Vitamin B12 deficiency  -     CBC & Differential  -     Vitamin B12    Gastroesophageal reflux disease, esophagitis presence not specified    Other orders  -     simvastatin (ZOCOR) 20 MG tablet; Take 1 tablet by mouth Every Night.  -     lisinopril (PRINIVIL,ZESTRIL) 20 MG tablet; Take 1 tablet by mouth Daily.        Hyperlipidemia.  Continue simvastatin.  Checking CMP and lipid panel today.    Hypertension.  Well-controlled.     Reported vitamin D deficiency and reported B12 deficiency.  Checking levels today along with CBC.    GERD.  Long-standing.  Risks of long-term omeprazole discussed.  No history of dysphagia.  No history of reported abnormal EGD.  I recommend he continue the omeprazole to take every other day.    Follow-up in 6 months.

## 2018-10-18 LAB
1,25(OH)2D3 SERPL-MCNC: 63.7 PG/ML (ref 19.9–79.3)
ALBUMIN SERPL-MCNC: 4.8 G/DL (ref 3.5–5.2)
ALBUMIN/GLOB SERPL: 1.7 G/DL
ALP SERPL-CCNC: 82 U/L (ref 39–117)
ALT SERPL-CCNC: 16 U/L (ref 1–41)
AST SERPL-CCNC: 19 U/L (ref 1–40)
BASOPHILS # BLD AUTO: 0.02 10*3/MM3 (ref 0–0.2)
BASOPHILS NFR BLD AUTO: 0.3 % (ref 0–1.5)
BILIRUB SERPL-MCNC: 0.6 MG/DL (ref 0.1–1.2)
BUN SERPL-MCNC: 19 MG/DL (ref 8–23)
BUN/CREAT SERPL: 16.8 (ref 7–25)
CALCIUM SERPL-MCNC: 9.6 MG/DL (ref 8.6–10.5)
CHLORIDE SERPL-SCNC: 100 MMOL/L (ref 98–107)
CHOLEST SERPL-MCNC: 158 MG/DL (ref 0–200)
CO2 SERPL-SCNC: 26.2 MMOL/L (ref 22–29)
CREAT SERPL-MCNC: 1.13 MG/DL (ref 0.76–1.27)
EOSINOPHIL # BLD AUTO: 0.11 10*3/MM3 (ref 0–0.7)
EOSINOPHIL NFR BLD AUTO: 1.7 % (ref 0.3–6.2)
ERYTHROCYTE [DISTWIDTH] IN BLOOD BY AUTOMATED COUNT: 13.3 % (ref 11.5–14.5)
GLOBULIN SER CALC-MCNC: 2.8 GM/DL
GLUCOSE SERPL-MCNC: 103 MG/DL (ref 65–99)
HCT VFR BLD AUTO: 48.9 % (ref 40.4–52.2)
HDLC SERPL-MCNC: 38 MG/DL (ref 40–60)
HGB BLD-MCNC: 15.4 G/DL (ref 13.7–17.6)
IMM GRANULOCYTES # BLD: 0 10*3/MM3 (ref 0–0.03)
IMM GRANULOCYTES NFR BLD: 0 % (ref 0–0.5)
LDLC SERPL CALC-MCNC: 100 MG/DL (ref 0–100)
LYMPHOCYTES # BLD AUTO: 2.32 10*3/MM3 (ref 0.9–4.8)
LYMPHOCYTES NFR BLD AUTO: 35.1 % (ref 19.6–45.3)
MCH RBC QN AUTO: 29.3 PG (ref 27–32.7)
MCHC RBC AUTO-ENTMCNC: 31.5 G/DL (ref 32.6–36.4)
MCV RBC AUTO: 93.1 FL (ref 79.8–96.2)
MONOCYTES # BLD AUTO: 0.62 10*3/MM3 (ref 0.2–1.2)
MONOCYTES NFR BLD AUTO: 9.4 % (ref 5–12)
NEUTROPHILS # BLD AUTO: 3.54 10*3/MM3 (ref 1.9–8.1)
NEUTROPHILS NFR BLD AUTO: 53.5 % (ref 42.7–76)
PLATELET # BLD AUTO: 257 10*3/MM3 (ref 140–500)
POTASSIUM SERPL-SCNC: 4.8 MMOL/L (ref 3.5–5.2)
PROT SERPL-MCNC: 7.6 G/DL (ref 6–8.5)
RBC # BLD AUTO: 5.25 10*6/MM3 (ref 4.6–6)
SODIUM SERPL-SCNC: 142 MMOL/L (ref 136–145)
TRIGL SERPL-MCNC: 100 MG/DL (ref 0–150)
VIT B12 SERPL-MCNC: 1142 PG/ML (ref 211–946)
VLDLC SERPL CALC-MCNC: 20 MG/DL (ref 5–40)
WBC # BLD AUTO: 6.61 10*3/MM3 (ref 4.5–10.7)

## 2018-10-19 NOTE — PROGRESS NOTES
Overall lab work looks good.  Cholesterol is stable compared to last year.  The B12 level is just minimally elevated.  No changes needed.

## 2018-11-16 ENCOUNTER — TELEPHONE (OUTPATIENT)
Dept: GASTROENTEROLOGY | Facility: CLINIC | Age: 78
End: 2018-11-16
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
## 2018-11-29 ENCOUNTER — OFFICE VISIT (OUTPATIENT)
Dept: FAMILY MEDICINE CLINIC | Facility: CLINIC | Age: 78
End: 2018-11-29

## 2018-11-29 VITALS
WEIGHT: 190.6 LBS | DIASTOLIC BLOOD PRESSURE: 83 MMHG | OXYGEN SATURATION: 95 % | HEIGHT: 70 IN | HEART RATE: 85 BPM | TEMPERATURE: 97.2 F | BODY MASS INDEX: 27.29 KG/M2 | SYSTOLIC BLOOD PRESSURE: 156 MMHG

## 2018-11-29 DIAGNOSIS — J40 BRONCHITIS: Primary | ICD-10-CM

## 2018-11-29 PROCEDURE — 99213 OFFICE O/P EST LOW 20 MIN: CPT | Performed by: FAMILY MEDICINE

## 2018-11-29 NOTE — PROGRESS NOTES
"Subjective   Arjun Plaza is a 78 y.o. male.     Chief Complaint   Patient presents with   • Cough     x 11-18-18 went to the u/c and still not better   • URI        History of Present Illness      Cough or greater than 2 weeks.  Got ill from a grandchild.  Wife same time.  Cold symptoms.  Now mostly chest congestion.  The cough is improved.  He went to an urgent care center 11 days ago.  Received either Augmentin or amoxicillin.  Otherwise doing well today.  His wife seems to have a worse cough that he does.    The following portions of the patient's history were reviewed and updated as appropriate: allergies, current medications, past family history, past medical history, past social history, past surgical history and problem list.          Review of Systems   Constitutional: Negative for fatigue and fever.   Respiratory: Positive for cough.    Gastrointestinal: Negative.    Skin: Negative for rash.       Objective   Blood pressure 156/83, pulse 85, temperature 97.2 °F (36.2 °C), temperature source Oral, height 177 cm (69.69\"), weight 86.5 kg (190 lb 9.6 oz), SpO2 95 %.  Physical Exam   Constitutional: No distress.   No acute distress.  Nontoxic.   HENT:   Right Ear: Tympanic membrane, external ear and ear canal normal.   Left Ear: Tympanic membrane, external ear and ear canal normal.   Nose: Nose normal.   Mouth/Throat: Oropharynx is clear and moist. No oropharyngeal exudate.   Eyes: Conjunctivae are normal. Right eye exhibits no discharge. Left eye exhibits no discharge. No scleral icterus.   Cardiovascular: Normal rate.   Pulmonary/Chest: Effort normal and breath sounds normal. No stridor. No respiratory distress. He has no wheezes. He has no rales.   No tachypnea   Lymphadenopathy:     He has no cervical adenopathy.   Skin: No rash noted.   Nursing note and vitals reviewed.      Assessment/Plan   Arjun was seen today for cough and uri.    Diagnoses and all orders for this visit:    Bronchitis      Very " probable viral bronchitis.  Resolving.  Over-the-counter Mucinex.  Observation.  With recurrent symptoms, fever, shortness breath, or other concerns please call.

## 2018-12-10 ENCOUNTER — TELEPHONE (OUTPATIENT)
Dept: GASTROENTEROLOGY | Facility: CLINIC | Age: 78
End: 2018-12-10

## 2018-12-10 NOTE — TELEPHONE ENCOUNTER
SCHEDULED 01/21/2019 [place].  NEEDS TO CANCEL, DOES NOT WANT TO RESCHEDULE.  NOTIFIED EFREN AT [place].

## 2019-04-22 DIAGNOSIS — E78.00 PURE HYPERCHOLESTEROLEMIA: Primary | ICD-10-CM

## 2019-04-22 DIAGNOSIS — E55.9 VITAMIN D DEFICIENCY: ICD-10-CM

## 2019-04-22 DIAGNOSIS — E53.8 VITAMIN B12 DEFICIENCY: ICD-10-CM

## 2019-04-22 DIAGNOSIS — I10 ESSENTIAL HYPERTENSION: ICD-10-CM

## 2019-04-24 LAB
25(OH)D3+25(OH)D2 SERPL-MCNC: 56 NG/ML (ref 30–100)
ALBUMIN SERPL-MCNC: 4.9 G/DL (ref 3.5–5.2)
ALBUMIN/GLOB SERPL: 2 G/DL
ALP SERPL-CCNC: 72 U/L (ref 39–117)
ALT SERPL-CCNC: 19 U/L (ref 1–41)
AST SERPL-CCNC: 24 U/L (ref 1–40)
BILIRUB SERPL-MCNC: 0.6 MG/DL (ref 0.2–1.2)
BUN SERPL-MCNC: 22 MG/DL (ref 8–23)
BUN/CREAT SERPL: 21 (ref 7–25)
CALCIUM SERPL-MCNC: 9.9 MG/DL (ref 8.6–10.5)
CHLORIDE SERPL-SCNC: 103 MMOL/L (ref 98–107)
CHOLEST SERPL-MCNC: 169 MG/DL (ref 0–200)
CO2 SERPL-SCNC: 28.8 MMOL/L (ref 22–29)
CREAT SERPL-MCNC: 1.05 MG/DL (ref 0.76–1.27)
GLOBULIN SER CALC-MCNC: 2.4 GM/DL
GLUCOSE SERPL-MCNC: 96 MG/DL (ref 65–99)
HDLC SERPL-MCNC: 37 MG/DL (ref 40–60)
LDLC SERPL CALC-MCNC: 106 MG/DL (ref 0–100)
POTASSIUM SERPL-SCNC: 4.4 MMOL/L (ref 3.5–5.2)
PROT SERPL-MCNC: 7.3 G/DL (ref 6–8.5)
SODIUM SERPL-SCNC: 143 MMOL/L (ref 136–145)
TRIGL SERPL-MCNC: 132 MG/DL (ref 0–150)
VIT B12 SERPL-MCNC: 1060 PG/ML (ref 211–946)
VLDLC SERPL CALC-MCNC: 26.4 MG/DL

## 2019-04-30 ENCOUNTER — OFFICE VISIT (OUTPATIENT)
Dept: FAMILY MEDICINE CLINIC | Facility: CLINIC | Age: 79
End: 2019-04-30

## 2019-04-30 VITALS
WEIGHT: 188 LBS | SYSTOLIC BLOOD PRESSURE: 150 MMHG | TEMPERATURE: 97.3 F | DIASTOLIC BLOOD PRESSURE: 91 MMHG | OXYGEN SATURATION: 95 % | HEART RATE: 87 BPM | HEIGHT: 70 IN | BODY MASS INDEX: 26.92 KG/M2

## 2019-04-30 DIAGNOSIS — K21.9 GASTROESOPHAGEAL REFLUX DISEASE, ESOPHAGITIS PRESENCE NOT SPECIFIED: ICD-10-CM

## 2019-04-30 DIAGNOSIS — E55.9 VITAMIN D DEFICIENCY: ICD-10-CM

## 2019-04-30 DIAGNOSIS — I10 ESSENTIAL HYPERTENSION: ICD-10-CM

## 2019-04-30 DIAGNOSIS — E53.8 VITAMIN B12 DEFICIENCY: ICD-10-CM

## 2019-04-30 DIAGNOSIS — E78.00 PURE HYPERCHOLESTEROLEMIA: Primary | ICD-10-CM

## 2019-04-30 PROBLEM — R43.0 ANOSMIA: Status: ACTIVE | Noted: 2019-04-30

## 2019-04-30 PROCEDURE — 99214 OFFICE O/P EST MOD 30 MIN: CPT | Performed by: FAMILY MEDICINE

## 2019-04-30 RX ORDER — SIMVASTATIN 20 MG
20 TABLET ORAL NIGHTLY
Qty: 90 TABLET | Refills: 1 | Status: SHIPPED | OUTPATIENT
Start: 2019-04-30 | End: 2019-10-14 | Stop reason: SDUPTHER

## 2019-04-30 RX ORDER — OMEPRAZOLE 20 MG/1
20 CAPSULE, DELAYED RELEASE ORAL DAILY
Qty: 90 CAPSULE | Refills: 1 | Status: SHIPPED | OUTPATIENT
Start: 2019-04-30 | End: 2019-11-18 | Stop reason: SDUPTHER

## 2019-04-30 RX ORDER — LISINOPRIL 20 MG/1
20 TABLET ORAL DAILY
Qty: 90 TABLET | Refills: 1 | Status: SHIPPED | OUTPATIENT
Start: 2019-04-30 | End: 2019-10-14 | Stop reason: SDUPTHER
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
## 2019-05-05 ENCOUNTER — RESULTS ENCOUNTER (OUTPATIENT)
Dept: FAMILY MEDICINE CLINIC | Facility: CLINIC | Age: 79
End: 2019-05-05

## 2019-05-05 DIAGNOSIS — E55.9 VITAMIN D DEFICIENCY: ICD-10-CM

## 2019-05-05 DIAGNOSIS — E53.8 VITAMIN B12 DEFICIENCY: ICD-10-CM

## 2019-07-29 ENCOUNTER — RESULTS ENCOUNTER (OUTPATIENT)
Dept: FAMILY MEDICINE CLINIC | Facility: CLINIC | Age: 79
End: 2019-07-29

## 2019-07-29 DIAGNOSIS — E78.00 PURE HYPERCHOLESTEROLEMIA: ICD-10-CM

## 2019-07-29 DIAGNOSIS — I10 ESSENTIAL HYPERTENSION: ICD-10-CM

## 2019-10-14 RX ORDER — SIMVASTATIN 20 MG
TABLET ORAL
Qty: 90 TABLET | Refills: 1 | Status: SHIPPED | OUTPATIENT
Start: 2019-10-14 | End: 2020-01-17 | Stop reason: SDUPTHER

## 2019-10-14 RX ORDER — LISINOPRIL 20 MG/1
TABLET ORAL
Qty: 90 TABLET | Refills: 1 | Status: SHIPPED | OUTPATIENT
Start: 2019-10-14 | End: 2020-01-17 | Stop reason: SDUPTHER

## 2019-11-18 RX ORDER — OMEPRAZOLE 20 MG/1
CAPSULE, DELAYED RELEASE ORAL
Qty: 90 CAPSULE | Refills: 1 | Status: SHIPPED | OUTPATIENT
Start: 2019-11-18 | End: 2020-01-17

## 2020-01-14 LAB
ALBUMIN SERPL-MCNC: 4.7 G/DL (ref 3.5–5.2)
ALBUMIN/GLOB SERPL: 1.8 G/DL
ALP SERPL-CCNC: 68 U/L (ref 39–117)
ALT SERPL-CCNC: 15 U/L (ref 1–41)
AST SERPL-CCNC: 20 U/L (ref 1–40)
BILIRUB SERPL-MCNC: 0.7 MG/DL (ref 0.2–1.2)
BUN SERPL-MCNC: 19 MG/DL (ref 8–23)
BUN/CREAT SERPL: 16.2 (ref 7–25)
CALCIUM SERPL-MCNC: 9.8 MG/DL (ref 8.6–10.5)
CHLORIDE SERPL-SCNC: 98 MMOL/L (ref 98–107)
CHOLEST SERPL-MCNC: 154 MG/DL (ref 0–200)
CO2 SERPL-SCNC: 31 MMOL/L (ref 22–29)
CREAT SERPL-MCNC: 1.17 MG/DL (ref 0.76–1.27)
GLOBULIN SER CALC-MCNC: 2.6 GM/DL
GLUCOSE SERPL-MCNC: 95 MG/DL (ref 65–99)
HDLC SERPL-MCNC: 36 MG/DL (ref 40–60)
LDLC SERPL CALC-MCNC: 95 MG/DL (ref 0–100)
POTASSIUM SERPL-SCNC: 5 MMOL/L (ref 3.5–5.2)
PROT SERPL-MCNC: 7.3 G/DL (ref 6–8.5)
SODIUM SERPL-SCNC: 141 MMOL/L (ref 136–145)
TRIGL SERPL-MCNC: 115 MG/DL (ref 0–150)
VLDLC SERPL CALC-MCNC: 23 MG/DL (ref 5–40)

## 2020-01-17 ENCOUNTER — OFFICE VISIT (OUTPATIENT)
Dept: FAMILY MEDICINE CLINIC | Facility: CLINIC | Age: 80
End: 2020-01-17

## 2020-01-17 VITALS
OXYGEN SATURATION: 98 % | TEMPERATURE: 97.1 F | DIASTOLIC BLOOD PRESSURE: 80 MMHG | SYSTOLIC BLOOD PRESSURE: 156 MMHG | HEART RATE: 80 BPM | BODY MASS INDEX: 26.48 KG/M2 | WEIGHT: 185 LBS | HEIGHT: 70 IN

## 2020-01-17 DIAGNOSIS — E78.00 PURE HYPERCHOLESTEROLEMIA: ICD-10-CM

## 2020-01-17 DIAGNOSIS — K21.9 GASTROESOPHAGEAL REFLUX DISEASE, ESOPHAGITIS PRESENCE NOT SPECIFIED: ICD-10-CM

## 2020-01-17 DIAGNOSIS — I10 ESSENTIAL HYPERTENSION: Primary | ICD-10-CM

## 2020-01-17 PROCEDURE — 99214 OFFICE O/P EST MOD 30 MIN: CPT | Performed by: FAMILY MEDICINE

## 2020-01-17 RX ORDER — SIMVASTATIN 20 MG
20 TABLET ORAL NIGHTLY
Qty: 90 TABLET | Refills: 1 | Status: SHIPPED | OUTPATIENT
Start: 2020-01-17 | End: 2020-03-06 | Stop reason: SDUPTHER

## 2020-01-17 RX ORDER — LISINOPRIL 20 MG/1
20 TABLET ORAL DAILY
Qty: 90 TABLET | Refills: 1 | Status: SHIPPED | OUTPATIENT
Start: 2020-01-17 | End: 2020-03-06 | Stop reason: SDUPTHER

## 2020-01-17 RX ORDER — OMEPRAZOLE 20 MG/1
20 CAPSULE, DELAYED RELEASE ORAL DAILY
Qty: 90 CAPSULE | Refills: 1 | Status: CANCELLED | OUTPATIENT
Start: 2020-01-17

## 2020-01-17 NOTE — PROGRESS NOTES
"Subjective   Arjun Plaza is a 79 y.o. male.     Hyperlipidemia (with lab review)    Hyperlipidemia         Hypertension follow up. Doing well with current medication which he is taking as directed. No known high or low blood pressure episodes. No cardiovascular or neurological symptoms. Today's BP: 156/80.      Hyperlipidemia follow up. He is taking statin medication without complaint. No myopathy symptoms.     Lab Results   Component Value Date    CHLPL 154 01/13/2020    TRIG 115 01/13/2020    HDL 36 (L) 01/13/2020    LDL 95 01/13/2020     GERD.  Intermittent.  Use related to meals.  Many years ago his wife gave him a omeprazole pill he is been taking it since.  Is not sure needs to take it.  In the past when he is try to go every other day has had some breakthrough heartburn symptoms, but only related to certain meals.  And usually taken care with calcium carbonate.  Recent calcium level normal.  Creatinine normal.  No dysphagia.  Food does not get stuck with swallowing.  No blood in the stool.  No dark black tarry stools.      The following portions of the patient's history were reviewed and updated as appropriate: allergies, current medications, past family history, past medical history, past social history, past surgical history and problem list.      Review of Systems   Constitutional: Negative.    Respiratory: Negative.    Cardiovascular: Negative.    Musculoskeletal: Negative.        Objective   Blood pressure 156/80, pulse 80, temperature 97.1 °F (36.2 °C), temperature source Oral, height 177 cm (69.69\"), weight 83.9 kg (185 lb), SpO2 98 %.  Physical Exam   Constitutional: He appears well-developed and well-nourished. No distress.   Neck: No thyromegaly present.   Cardiovascular: Normal rate, regular rhythm, normal heart sounds and intact distal pulses.   Pulmonary/Chest: Effort normal and breath sounds normal.   Musculoskeletal: He exhibits no edema.   Skin: Skin is warm and dry.   Psychiatric: He " has a normal mood and affect. His behavior is normal. Judgment and thought content normal.   Nursing note and vitals reviewed.      Assessment/Plan   Arjun was seen today for hyperlipidemia.    Diagnoses and all orders for this visit:    Essential hypertension    Pure hypercholesterolemia    Gastroesophageal reflux disease, esophagitis presence not specified    Other orders  -     simvastatin (ZOCOR) 20 MG tablet; Take 1 tablet by mouth Every Night.  -     lisinopril (PRINIVIL,ZESTRIL) 20 MG tablet; Take 1 tablet by mouth Daily.    Hypertension.  Well-controlled, refilled lisinopril.    Hyperlipidemia.  Continue simvastatin.  Refill given.    GERD.  At this time no red flag symptoms.  We are going to discontinue the omeprazole after a three-week weaning process.  Okay for over-the-counter Pepcid.  Okay for over-the-counter calcium carbonate.  Potential adverse long-term side effects omeprazole discussed in detail with patient.  He is aware.    Follow-up in 6 months recheck and Medicare wellness visit

## 2020-03-06 RX ORDER — SIMVASTATIN 20 MG
20 TABLET ORAL NIGHTLY
Qty: 90 TABLET | Refills: 1 | Status: SHIPPED | OUTPATIENT
Start: 2020-03-06 | End: 2020-05-19 | Stop reason: SDUPTHER

## 2020-03-06 RX ORDER — LISINOPRIL 20 MG/1
20 TABLET ORAL DAILY
Qty: 90 TABLET | Refills: 1 | Status: SHIPPED | OUTPATIENT
Start: 2020-03-06 | End: 2020-09-08 | Stop reason: SDUPTHER

## 2020-03-06 RX ORDER — OMEPRAZOLE 20 MG/1
20 CAPSULE, DELAYED RELEASE ORAL DAILY
Qty: 90 CAPSULE | Refills: 1 | Status: SHIPPED | OUTPATIENT
Start: 2020-03-06 | End: 2020-09-08 | Stop reason: SDUPTHER

## 2020-05-19 ENCOUNTER — TELEPHONE (OUTPATIENT)
Dept: FAMILY MEDICINE CLINIC | Facility: CLINIC | Age: 80
End: 2020-05-19

## 2020-05-19 RX ORDER — SIMVASTATIN 20 MG
20 TABLET ORAL NIGHTLY
Qty: 90 TABLET | Refills: 1 | Status: SHIPPED | OUTPATIENT
Start: 2020-05-19 | End: 2020-09-08 | Stop reason: SDUPTHER

## 2020-09-08 RX ORDER — LISINOPRIL 20 MG/1
20 TABLET ORAL DAILY
Qty: 90 TABLET | Refills: 1 | Status: SHIPPED | OUTPATIENT
Start: 2020-09-08 | End: 2021-02-16

## 2020-09-08 RX ORDER — OMEPRAZOLE 20 MG/1
20 CAPSULE, DELAYED RELEASE ORAL DAILY
Qty: 90 CAPSULE | Refills: 1 | Status: SHIPPED | OUTPATIENT
Start: 2020-09-08 | End: 2021-06-01 | Stop reason: SDUPTHER

## 2020-09-08 RX ORDER — SIMVASTATIN 20 MG
20 TABLET ORAL NIGHTLY
Qty: 90 TABLET | Refills: 1 | Status: SHIPPED | OUTPATIENT
Start: 2020-09-08 | End: 2021-06-01 | Stop reason: SDUPTHER

## 2020-09-08 NOTE — TELEPHONE ENCOUNTER
Caller: Catherine Plaza    Relationship: Emergency Contact    Best call back number: [phone]        Medication needed:      simvastatin (ZOCOR) 20 MG tablet  20 mg, Nightly      lisinopril (PRINIVIL,ZESTRIL) 20 MG tablet  20 mg, Daily      omeprazole (PrilOSEC) 20 MG capsule  20 mg, Daily     90 day supply on all.                Does the patient have less than a 3 day supply:  [] Yes  [x] No    What is the patient's preferred pharmacy:      [facility] - [address] [phone] [facility] [phone]

## 2020-10-20 DIAGNOSIS — E78.00 PURE HYPERCHOLESTEROLEMIA: Primary | ICD-10-CM

## 2020-10-20 DIAGNOSIS — E55.9 VITAMIN D DEFICIENCY: ICD-10-CM

## 2020-10-20 DIAGNOSIS — Z00.00 HEALTHCARE MAINTENANCE: ICD-10-CM

## 2020-10-20 DIAGNOSIS — E53.8 VITAMIN B12 DEFICIENCY: ICD-10-CM

## 2020-11-04 LAB
25(OH)D3+25(OH)D2 SERPL-MCNC: 59.4 NG/ML (ref 30–100)
ALBUMIN SERPL-MCNC: 4.6 G/DL (ref 3.5–5.2)
ALBUMIN/GLOB SERPL: 1.9 G/DL
ALP SERPL-CCNC: 77 U/L (ref 39–117)
ALT SERPL-CCNC: 15 U/L (ref 1–41)
AST SERPL-CCNC: 20 U/L (ref 1–40)
BILIRUB SERPL-MCNC: 0.5 MG/DL (ref 0–1.2)
BUN SERPL-MCNC: 20 MG/DL (ref 8–23)
BUN/CREAT SERPL: 16.5 (ref 7–25)
CALCIUM SERPL-MCNC: 9.4 MG/DL (ref 8.6–10.5)
CHLORIDE SERPL-SCNC: 103 MMOL/L (ref 98–107)
CHOLEST SERPL-MCNC: 171 MG/DL (ref 0–200)
CO2 SERPL-SCNC: 29.8 MMOL/L (ref 22–29)
CREAT SERPL-MCNC: 1.21 MG/DL (ref 0.76–1.27)
GLOBULIN SER CALC-MCNC: 2.4 GM/DL
GLUCOSE SERPL-MCNC: 99 MG/DL (ref 65–99)
HCV AB S/CO SERPL IA: <0.1 S/CO RATIO (ref 0–0.9)
HDLC SERPL-MCNC: 39 MG/DL (ref 40–60)
LDLC SERPL CALC-MCNC: 99 MG/DL (ref 0–100)
POTASSIUM SERPL-SCNC: 5.3 MMOL/L (ref 3.5–5.2)
PROT SERPL-MCNC: 7 G/DL (ref 6–8.5)
SODIUM SERPL-SCNC: 142 MMOL/L (ref 136–145)
TRIGL SERPL-MCNC: 192 MG/DL (ref 0–150)
VIT B12 SERPL-MCNC: 1000 PG/ML (ref 211–946)
VLDLC SERPL CALC-MCNC: 33 MG/DL (ref 5–40)

## 2020-11-06 ENCOUNTER — OFFICE VISIT (OUTPATIENT)
Dept: FAMILY MEDICINE CLINIC | Facility: CLINIC | Age: 80
End: 2020-11-06

## 2020-11-06 VITALS
WEIGHT: 180.3 LBS | SYSTOLIC BLOOD PRESSURE: 181 MMHG | TEMPERATURE: 97.5 F | DIASTOLIC BLOOD PRESSURE: 89 MMHG | HEIGHT: 70 IN | HEART RATE: 85 BPM | BODY MASS INDEX: 25.81 KG/M2 | OXYGEN SATURATION: 95 %

## 2020-11-06 DIAGNOSIS — Z00.00 MEDICARE ANNUAL WELLNESS VISIT, SUBSEQUENT: ICD-10-CM

## 2020-11-06 DIAGNOSIS — K21.9 GASTROESOPHAGEAL REFLUX DISEASE, UNSPECIFIED WHETHER ESOPHAGITIS PRESENT: ICD-10-CM

## 2020-11-06 DIAGNOSIS — E78.00 PURE HYPERCHOLESTEROLEMIA: ICD-10-CM

## 2020-11-06 DIAGNOSIS — I10 ESSENTIAL HYPERTENSION: Primary | ICD-10-CM

## 2020-11-06 PROCEDURE — 96160 PT-FOCUSED HLTH RISK ASSMT: CPT | Performed by: FAMILY MEDICINE

## 2020-11-06 PROCEDURE — G0009 ADMIN PNEUMOCOCCAL VACCINE: HCPCS | Performed by: FAMILY MEDICINE

## 2020-11-06 PROCEDURE — G0439 PPPS, SUBSEQ VISIT: HCPCS | Performed by: FAMILY MEDICINE

## 2020-11-06 PROCEDURE — 90732 PPSV23 VACC 2 YRS+ SUBQ/IM: CPT | Performed by: FAMILY MEDICINE

## 2020-11-06 PROCEDURE — 99214 OFFICE O/P EST MOD 30 MIN: CPT | Performed by: FAMILY MEDICINE

## 2020-11-06 NOTE — PROGRESS NOTES
The ABCs of the Annual Wellness Visit  Subsequent Medicare Wellness Visit    Chief Complaint   Patient presents with   • Medicare Wellness-subsequent       Subjective   History of Present Illness:  Arjun Plaza is a 80 y.o. male who presents for a Subsequent Medicare Wellness Visit.    HEALTH RISK ASSESSMENT    Recent Hospitalizations:  No hospitalization(s) within the last year.    Current Medical Providers:  Patient Care Team:  Delon Reynolds MD as PCP - General (Family Medicine)  John Larson MD as PCP - Family Medicine    Smoking Status:  Social History     Tobacco Use   Smoking Status Never Smoker   Smokeless Tobacco Never Used       Alcohol Consumption:  Social History     Substance and Sexual Activity   Alcohol Use No       Depression Screen:   PHQ-2/PHQ-9 Depression Screening 11/6/2020   Little interest or pleasure in doing things 0   Feeling down, depressed, or hopeless 0   Total Score 0       Fall Risk Screen:  ELLYNADI Fall Risk Assessment was completed, and patient is at LOW risk for falls.Assessment completed on:11/6/2020    Health Habits and Functional and Cognitive Screening:  Functional & Cognitive Status 11/6/2020   Do you have difficulty preparing food and eating? No   Do you have difficulty bathing yourself, getting dressed or grooming yourself? No   Do you have difficulty using the toilet? No   Do you have difficulty moving around from place to place? No   Do you have trouble with steps or getting out of a bed or a chair? No   Current Diet Well Balanced Diet   Dental Exam Up to date   Eye Exam Up to date   Exercise (times per week) 7 times per week   Current Exercise Activities Include Walking   Do you need help using the phone?  Yes   Are you deaf or do you have serious difficulty hearing?  Yes   Do you need help with transportation? No   Do you need help shopping? No   Do you need help preparing meals?  No   Do you need help with housework?  No   Do you need help with laundry? No    Do you need help taking your medications? No   Do you need help managing money? No   Do you ever drive or ride in a car without wearing a seat belt? No   Have you felt unusual stress, anger or loneliness in the last month? Yes   Who do you live with? Spouse   If you need help, do you have trouble finding someone available to you? No   Have you been bothered in the last four weeks by sexual problems? No   Do you have difficulty concentrating, remembering or making decisions? No         Does the patient have evidence of cognitive impairment? No    Asprin use counseling:Does not need ASA but is currently taking (advised patient that ASA is not indicated and patient chooses to stop it)    Age-appropriate Screening Schedule:  Refer to the list below for future screening recommendations based on patient's age, sex and/or medical conditions. Orders for these recommended tests are listed in the plan section. The patient has been provided with a written plan.    Health Maintenance   Topic Date Due   • ZOSTER VACCINE (2 of 2) 09/22/2017   • INFLUENZA VACCINE  08/01/2020   • LIPID PANEL  11/03/2021   • COLONOSCOPY  05/01/2024   • TDAP/TD VACCINES (2 - Td) 07/28/2027          The following portions of the patient's history were reviewed and updated as appropriate: allergies, current medications, past family history, past medical history, past social history, past surgical history and problem list.    Outpatient Medications Prior to Visit   Medication Sig Dispense Refill   • cholecalciferol (VITAMIN D3) 1000 units tablet Take 1,000 Units by mouth Daily.     • lisinopril (PRINIVIL,ZESTRIL) 20 MG tablet Take 1 tablet by mouth Daily. 90 tablet 1   • Omega-3 Fatty Acids (FISH OIL) 1000 MG capsule capsule Take  by mouth daily with breakfast.     • omeprazole (PrilOSEC) 20 MG capsule Take 1 capsule by mouth Daily. (Patient taking differently: Take 20 mg by mouth Every Other Day.) 90 capsule 1   • simvastatin (ZOCOR) 20 MG tablet  "Take 1 tablet by mouth Every Night. 90 tablet 1   • vitamin B-12 (CYANOCOBALAMIN) 1000 MCG tablet Take 1,000 mcg by mouth Daily.     • aspirin 81 MG EC tablet Take 81 mg by mouth daily.       No facility-administered medications prior to visit.        Patient Active Problem List   Diagnosis   • Pure hypercholesterolemia   • Essential hypertension   • Vitamin B12 deficiency   • Vitamin D deficiency   • Gastroesophageal reflux disease   • Anosmia       Advanced Care Planning:  ACP discussion was held with the patient during this visit. Patient has an advance directive (not in EMR), copy requested.    Review of Systems    Compared to one year ago, the patient feels his physical health is the same.  Compared to one year ago, the patient feels his mental health is the same.    Reviewed chart for potential of high risk medication in the elderly: yes  Reviewed chart for potential of harmful drug interactions in the elderly:yes    Objective         Vitals:    11/06/20 1027   BP: (!) 181/89   Pulse: 85   Temp: 97.5 °F (36.4 °C)   TempSrc: Temporal   SpO2: 95%   Weight: 81.8 kg (180 lb 4.8 oz)   Height: 177 cm (69.69\")       Body mass index is 26.1 kg/m².  Discussed the patient's BMI with him. The BMI is in the acceptable range.    Physical Exam    Lab Results   Component Value Date    GLU 99 11/03/2020    CHLPL 171 11/03/2020    TRIG 192 (H) 11/03/2020    HDL 39 (L) 11/03/2020    LDL 99 11/03/2020    VLDL 33 11/03/2020        Assessment/Plan   Medicare Risks and Personalized Health Plan  CMS Preventative Services Quick Reference  Advance Directive Discussion   Patient declined flu vaccine  Pneumovax 23 given today      The above risks/problems have been discussed with the patient.  Pertinent information has been shared with the patient in the After Visit Summary.  Follow up plans and orders are seen below in the Assessment/Plan Section.    There are no diagnoses linked to this encounter.  Follow Up:  No follow-ups on file. "     An After Visit Summary and PPPS were given to the patient.

## 2020-11-06 NOTE — PROGRESS NOTES
"Subjective   Arjun Plaza is a 80 y.o. male.     Medicare Wellness-subsequent    History of Present Illness    Hypertension follow up. Doing well with current medication which he is taking as directed.  Blood pressure is very elevated today.  Patient states he is very stressed because of the news on the election.  He states he is checked his blood pressure at home in recent months and has been normal.. No cardiovascular or neurological symptoms. Today's BP: (!) 181/89.      Hyperlipidemia follow up. He is taking statin medication without complaint. No myopathy symptoms.     Lab Results   Component Value Date    CHLPL 171 11/03/2020    TRIG 192 (H) 11/03/2020    HDL 39 (L) 11/03/2020    LDL 99 11/03/2020     GERD.  Patient continues omeprazole 20 mg which he takes every other day and it works fine for him like that.    The following portions of the patient's history were reviewed and updated as appropriate: allergies, current medications, past family history, past medical history, past social history, past surgical history and problem list.      Review of Systems   Constitutional: Negative.    Respiratory: Negative.    Cardiovascular: Negative.    Neurological: Negative.    Psychiatric/Behavioral: Negative.        Objective   Blood pressure (!) 181/89, pulse 85, temperature 97.5 °F (36.4 °C), temperature source Temporal, height 177 cm (69.69\"), weight 81.8 kg (180 lb 4.8 oz), SpO2 95 %.  Physical Exam  Vitals signs and nursing note reviewed.   Constitutional:       General: He is not in acute distress.     Appearance: He is well-developed.   Neck:      Thyroid: No thyromegaly.   Cardiovascular:      Rate and Rhythm: Normal rate and regular rhythm.      Heart sounds: Normal heart sounds.   Pulmonary:      Effort: Pulmonary effort is normal.      Breath sounds: Normal breath sounds.   Skin:     General: Skin is warm and dry.   Psychiatric:         Behavior: Behavior normal.         Thought Content: Thought content " "normal.         Judgment: Judgment normal.       Repeat blood pressure remains 180 systolic    Assessment/Plan   Diagnoses and all orders for this visit:    1. Medicare annual wellness visit, subsequent (Primary)    2. Pure hypercholesterolemia    3. Essential hypertension    4. Gastroesophageal reflux disease, unspecified whether esophagitis present    Other orders  -     Pneumococcal Polysaccharide Vaccine 23-Valent Greater Than or Equal To 1yo Subcutaneous / IM    Hypertension.  Not controlled.  I want to start him on amlodipine.  Patient declines.  He states \"they have tried medicine in the past and it did not work\".  He will continue the lisinopril.  He understands the importance of checking his blood pressure daily.  I am going to see him back in about 4 to 6 weeks for telephone visit.  If the blood pressures are running high at home he understands to let us know.      Situational stressors.  May be into his hypertension today.    Hyperlipidemia.  Continue simvastatin.  Recent blood panel favorable.    GERD.  He continues omeprazole every other day.    I will see him back in 6 weeks above for telephone visit on a blood pressure check, in 6 months for recheck altogether.         "

## 2020-11-09 RX ORDER — OMEPRAZOLE 20 MG/1
20 CAPSULE, DELAYED RELEASE ORAL DAILY
Qty: 90 CAPSULE | Refills: 1 | OUTPATIENT
Start: 2020-11-09

## 2020-11-09 RX ORDER — LISINOPRIL 20 MG/1
20 TABLET ORAL DAILY
Qty: 90 TABLET | Refills: 1 | OUTPATIENT
Start: 2020-11-09

## 2020-11-09 RX ORDER — SIMVASTATIN 20 MG
20 TABLET ORAL NIGHTLY
Qty: 90 TABLET | Refills: 1 | OUTPATIENT
Start: 2020-11-09

## 2020-11-09 NOTE — TELEPHONE ENCOUNTER
Caller: Catherine Plaza    Relationship: Emergency Contact    Best call back number:     Medication needed:   Requested Prescriptions     Pending Prescriptions Disp Refills   • simvastatin (ZOCOR) 20 MG tablet 90 tablet 1     Sig: Take 1 tablet by mouth Every Night.   • omeprazole (PrilOSEC) 20 MG capsule 90 capsule 1     Sig: Take 1 capsule by mouth Daily.   • lisinopril (PRINIVIL,ZESTRIL) 20 MG tablet 90 tablet 1     Sig: Take 1 tablet by mouth Daily.       When do you need the refill by:     What details did the patient provide when requesting the medication:     Does the patient have less than a 3 day supply:  [] Yes  [x] No    What is the patient's preferred pharmacy:      [facility]  [address] [facility]  [phone]        
PATIENTS WIFE IS CALLING IN SHE STATES SHE IS WANTING TO KNOW IF THESE WILL BE SENT THROUGH TODAY.  
SPOKE TO PT'S WIFE STATING THAT HE SHOULD HAVE A REFILL AVAILABLE.   
Speaking Coherently

## 2020-12-18 ENCOUNTER — OFFICE VISIT (OUTPATIENT)
Dept: FAMILY MEDICINE CLINIC | Facility: CLINIC | Age: 80
End: 2020-12-18

## 2020-12-18 DIAGNOSIS — I10 ESSENTIAL HYPERTENSION: Primary | ICD-10-CM

## 2020-12-18 PROCEDURE — 99442 PR PHYS/QHP TELEPHONE EVALUATION 11-20 MIN: CPT | Performed by: FAMILY MEDICINE

## 2020-12-18 NOTE — PROGRESS NOTES
Subjective   Arjun Plaza is a 80 y.o. male.     Chief Complaint   Patient presents with   • Hypertension        History of Present Illness      You have chosen to receive care through a telephone visit. Do you consent to use a telephone visit for your medical care today? Yes    Follow-up hypertension.  Blood pressure is very elevated last visit.  He is taking lisinopril 20 mg a day.  He was very agitated 2 weeks ago because of the election outcome.  I had recommended adding amlodipine but he wanted to wait.  He has been checking his blood pressure very regularly.  He read out all the numbers to me.  At least a dozen.  They are averaging in the 120s over 80s.  He has no cardiovascular symptoms no other complaints.  He states he is doing much better now that he is relaxing.          The following portions of the patient's history were reviewed and updated as appropriate: allergies, current medications, past family history, past medical history, past social history, past surgical history and problem list.          Review of Systems   Constitutional: Negative.    Respiratory: Negative.    Cardiovascular: Negative.    Psychiatric/Behavioral: Negative.        Objective   There were no vitals taken for this visit.  Physical Exam  Constitutional:       Comments: Patient is well-known to me.  He sounds in no acute distress.   Pulmonary:      Effort: Pulmonary effort is normal. No respiratory distress.   Neurological:      Mental Status: He is oriented to person, place, and time.   Psychiatric:         Mood and Affect: Mood normal.         Behavior: Behavior normal.         Assessment/Plan   Diagnoses and all orders for this visit:    1. Essential hypertension (Primary)        Essential hypertension.  Well-controlled.  The transient elevation of blood pressure has resolved.  Like related to situational stressors.  I will see him back as scheduled.  He will continue the lisinopril as is.    Duration of visit  approximately 12 minutes

## 2021-02-16 RX ORDER — LISINOPRIL 20 MG/1
TABLET ORAL
Qty: 90 TABLET | Refills: 1 | Status: SHIPPED | OUTPATIENT
Start: 2021-02-16 | End: 2021-06-01 | Stop reason: SDUPTHER

## 2021-05-26 DIAGNOSIS — E53.8 VITAMIN B12 DEFICIENCY: Primary | ICD-10-CM

## 2021-05-26 DIAGNOSIS — E78.00 PURE HYPERCHOLESTEROLEMIA: ICD-10-CM

## 2021-05-26 DIAGNOSIS — E55.9 VITAMIN D DEFICIENCY: ICD-10-CM

## 2021-05-28 LAB
25(OH)D3+25(OH)D2 SERPL-MCNC: 58.9 NG/ML (ref 30–100)
ALBUMIN SERPL-MCNC: 4.9 G/DL (ref 3.5–5.2)
ALBUMIN/GLOB SERPL: 2 G/DL
ALP SERPL-CCNC: 68 U/L (ref 39–117)
ALT SERPL-CCNC: 15 U/L (ref 1–41)
AST SERPL-CCNC: 19 U/L (ref 1–40)
BILIRUB SERPL-MCNC: 0.4 MG/DL (ref 0–1.2)
BUN SERPL-MCNC: 19 MG/DL (ref 8–23)
BUN/CREAT SERPL: 18.4 (ref 7–25)
CALCIUM SERPL-MCNC: 10 MG/DL (ref 8.6–10.5)
CHLORIDE SERPL-SCNC: 103 MMOL/L (ref 98–107)
CHOLEST SERPL-MCNC: 159 MG/DL (ref 0–200)
CO2 SERPL-SCNC: 30.5 MMOL/L (ref 22–29)
CREAT SERPL-MCNC: 1.03 MG/DL (ref 0.76–1.27)
GLOBULIN SER CALC-MCNC: 2.4 GM/DL
GLUCOSE SERPL-MCNC: 102 MG/DL (ref 65–99)
HDLC SERPL-MCNC: 37 MG/DL (ref 40–60)
LDLC SERPL CALC-MCNC: 95 MG/DL (ref 0–100)
POTASSIUM SERPL-SCNC: 5.1 MMOL/L (ref 3.5–5.2)
PROT SERPL-MCNC: 7.3 G/DL (ref 6–8.5)
SODIUM SERPL-SCNC: 143 MMOL/L (ref 136–145)
TRIGL SERPL-MCNC: 156 MG/DL (ref 0–150)
VIT B12 SERPL-MCNC: 617 PG/ML (ref 211–946)
VLDLC SERPL CALC-MCNC: 27 MG/DL (ref 5–40)

## 2021-06-01 ENCOUNTER — OFFICE VISIT (OUTPATIENT)
Dept: FAMILY MEDICINE CLINIC | Facility: CLINIC | Age: 81
End: 2021-06-01

## 2021-06-01 VITALS
TEMPERATURE: 97.1 F | HEIGHT: 70 IN | OXYGEN SATURATION: 95 % | SYSTOLIC BLOOD PRESSURE: 189 MMHG | HEART RATE: 87 BPM | DIASTOLIC BLOOD PRESSURE: 87 MMHG | WEIGHT: 179.1 LBS | BODY MASS INDEX: 25.64 KG/M2

## 2021-06-01 DIAGNOSIS — I10 ESSENTIAL HYPERTENSION: Primary | ICD-10-CM

## 2021-06-01 PROCEDURE — 99213 OFFICE O/P EST LOW 20 MIN: CPT | Performed by: FAMILY MEDICINE

## 2021-06-01 RX ORDER — LISINOPRIL 20 MG/1
20 TABLET ORAL DAILY
Qty: 90 TABLET | Refills: 1 | Status: SHIPPED | OUTPATIENT
Start: 2021-06-01 | End: 2021-12-06

## 2021-06-01 RX ORDER — OMEPRAZOLE 20 MG/1
20 CAPSULE, DELAYED RELEASE ORAL EVERY OTHER DAY
Qty: 90 CAPSULE | Refills: 0 | Status: SHIPPED | OUTPATIENT
Start: 2021-06-01 | End: 2021-12-06 | Stop reason: SDUPTHER

## 2021-06-01 RX ORDER — SIMVASTATIN 20 MG
20 TABLET ORAL NIGHTLY
Qty: 90 TABLET | Refills: 1 | Status: SHIPPED | OUTPATIENT
Start: 2021-06-01 | End: 2021-12-06 | Stop reason: SDUPTHER

## 2021-06-01 NOTE — PROGRESS NOTES
"Chief Complaint  Hypertension (follow up )    Subjective          Arjun Plaza presents to [facility]  History of Present Illness    Follow-up hypertension.  Probable whitecoat syndrome also.  He continues lisinopril 20 mg a day.  Recent creatinine normal.  He was checking his blood pressure at home for a period of time.  It was running about 120 systolic on average.  He has had some stressors.  His wife was recently injured and broke her arm.  He has no concerns about his health today.  Blood pressure elevated.  Repeat blood pressure 160/80.     Objective   Vital Signs:   BP (!) 189/87 Comment: pt states has white coat syndrome  Pulse 87   Temp 97.1 °F (36.2 °C) (Temporal)   Ht 177 cm (69.69\")   Wt 81.2 kg (179 lb 1.6 oz)   SpO2 95%   BMI 25.93 kg/m²     Physical Exam  Vitals and nursing note reviewed.   Constitutional:       General: He is not in acute distress.     Appearance: He is well-developed.   Cardiovascular:      Rate and Rhythm: Normal rate and regular rhythm.      Heart sounds: Normal heart sounds.   Pulmonary:      Effort: Pulmonary effort is normal.      Breath sounds: Normal breath sounds.   Skin:     General: Skin is warm and dry.   Psychiatric:         Behavior: Behavior normal.         Thought Content: Thought content normal.         Judgment: Judgment normal.        Result Review :   The following data was reviewed by: Delon Reynolds MD on 06/01/2021:  Common labs    Common Labsle 11/3/20 11/3/20 5/27/21 5/27/21    1005 1005 1013 1013   Glucose 99  102 (A)    BUN 20  19    Creatinine 1.21  1.03    eGFR Non African Am 58 (A)  69    eGFR African Am 70  84    Sodium 142  143    Potassium 5.3 (A)  5.1    Chloride 103  103    Calcium 9.4  10.0    Total Protein 7.0  7.3    Albumin 4.60  4.90    Total Bilirubin 0.5  0.4    Alkaline Phosphatase 77  68    AST (SGOT) 20  19    ALT (SGPT) 15  15    Total Cholesterol  171  159   Triglycerides  192 (A)  156 (A) "   HDL Cholesterol  39 (A)  37 (A)   LDL Cholesterol   99  95   (A) Abnormal value       Comments are available for some flowsheets but are not being displayed.                     Assessment and Plan    Diagnoses and all orders for this visit:    1. Essential hypertension (Primary)    Other orders  -     lisinopril (PRINIVIL,ZESTRIL) 20 MG tablet; Take 1 tablet by mouth Daily.  Dispense: 90 tablet; Refill: 1  -     simvastatin (ZOCOR) 20 MG tablet; Take 1 tablet by mouth Every Night.  Dispense: 90 tablet; Refill: 1  -     omeprazole (PrilOSEC) 20 MG capsule; Take 1 capsule by mouth Every Other Day.  Dispense: 90 capsule; Refill: 0      Hypertension.  Continue lisinopril.  Blood pressure appears a well controlled at home with an element of whitecoat syndrome.    Hyperlipidemia.  He continues simvastatin 20 mg daily.  Lipid panel overall favorable.    GERD.  Stable.  He takes omeprazole every other day.      Follow Up   No follow-ups on file.  Patient was given instructions and counseling regarding his condition or for health maintenance advice. Please see specific information pulled into the AVS if appropriate.       Answers for HPI/ROS submitted by the patient on 5/30/2021  What is the primary reason for your visit?: Other  Please describe your symptoms.: 6 month check up to discuss blood [facility]  Have you had these symptoms before?: Yes  How long have you been having these symptoms?: Greater than 2 weeks  Please list any medications you are currently taking for this condition.: Simvastatin tabs 20 mg, Lisinopril tabs 20 mg, Omeprazole tabs

## 2021-08-17 ENCOUNTER — LAB REQUISITION (OUTPATIENT)
Dept: LAB | Facility: HOSPITAL | Age: 81
End: 2021-08-17

## 2021-08-17 DIAGNOSIS — Z00.00 ENCOUNTER FOR GENERAL ADULT MEDICAL EXAMINATION WITHOUT ABNORMAL FINDINGS: ICD-10-CM

## 2021-08-17 PROCEDURE — 88331 PATH CONSLTJ SURG 1 BLK 1SPC: CPT | Performed by: OPHTHALMOLOGY

## 2021-08-17 PROCEDURE — 88305 TISSUE EXAM BY PATHOLOGIST: CPT | Performed by: OPHTHALMOLOGY

## 2021-08-19 LAB
CYTO UR: NORMAL
LAB AP CASE REPORT: NORMAL
LAB AP CLINICAL INFORMATION: NORMAL
LAB AP DIAGNOSIS COMMENT: NORMAL
Lab: NORMAL
PATH REPORT.FINAL DX SPEC: NORMAL
PATH REPORT.GROSS SPEC: NORMAL

## 2021-10-25 ENCOUNTER — PATIENT ROUNDING (BHMG ONLY) (OUTPATIENT)
Dept: ORTHOPEDIC SURGERY | Facility: CLINIC | Age: 81
End: 2021-10-25

## 2021-10-25 ENCOUNTER — OFFICE VISIT (OUTPATIENT)
Dept: ORTHOPEDIC SURGERY | Facility: CLINIC | Age: 81
End: 2021-10-25

## 2021-10-25 VITALS
WEIGHT: 181.6 LBS | BODY MASS INDEX: 26 KG/M2 | HEIGHT: 70 IN | DIASTOLIC BLOOD PRESSURE: 80 MMHG | HEART RATE: 80 BPM | RESPIRATION RATE: 16 BRPM | SYSTOLIC BLOOD PRESSURE: 176 MMHG

## 2021-10-25 DIAGNOSIS — M25.512 LEFT SHOULDER PAIN, UNSPECIFIED CHRONICITY: Primary | ICD-10-CM

## 2021-10-25 DIAGNOSIS — M19.019 AC JOINT ARTHROPATHY: ICD-10-CM

## 2021-10-25 DIAGNOSIS — M19.012 PRIMARY LOCALIZED OSTEOARTHROSIS OF LEFT SHOULDER REGION: ICD-10-CM

## 2021-10-25 PROCEDURE — 20610 DRAIN/INJ JOINT/BURSA W/O US: CPT | Performed by: ORTHOPAEDIC SURGERY

## 2021-10-25 PROCEDURE — 99203 OFFICE O/P NEW LOW 30 MIN: CPT | Performed by: ORTHOPAEDIC SURGERY

## 2021-10-25 PROCEDURE — 73030 X-RAY EXAM OF SHOULDER: CPT | Performed by: ORTHOPAEDIC SURGERY

## 2021-10-25 RX ORDER — NIACIN 500 MG
500 TABLET ORAL NIGHTLY
COMMUNITY

## 2021-10-25 RX ORDER — TRIAMCINOLONE ACETONIDE 40 MG/ML
80 INJECTION, SUSPENSION INTRA-ARTICULAR; INTRAMUSCULAR
Status: COMPLETED | OUTPATIENT
Start: 2021-10-25 | End: 2021-10-25

## 2021-10-25 RX ORDER — LIDOCAINE HYDROCHLORIDE 10 MG/ML
4 INJECTION, SOLUTION EPIDURAL; INFILTRATION; INTRACAUDAL; PERINEURAL
Status: COMPLETED | OUTPATIENT
Start: 2021-10-25 | End: 2021-10-25

## 2021-10-25 RX ORDER — MULTIPLE VITAMINS W/ MINERALS TAB 9MG-400MCG
1 TAB ORAL DAILY
COMMUNITY

## 2021-10-25 RX ADMIN — TRIAMCINOLONE ACETONIDE 80 MG: 40 INJECTION, SUSPENSION INTRA-ARTICULAR; INTRAMUSCULAR at 09:54

## 2021-10-25 RX ADMIN — LIDOCAINE HYDROCHLORIDE 4 ML: 10 INJECTION, SOLUTION EPIDURAL; INFILTRATION; INTRACAUDAL; PERINEURAL at 09:54

## 2021-10-25 NOTE — PROGRESS NOTES
October 25, 2021  SPOKE WITH PATIENT AT CHECK OUT    [facility]  [address] [address] [phone].    Before we get started may I verify your date of birth? 1940    Tell me about your visit with us. What things went well?  Every thing was really good.         We're always looking for ways to make our patients' experiences even better. Do you have recommendations on ways we may improve? No    Overall were you satisfied with your first visit to our practice? Yes       I appreciate you taking the time to speak with me today. Is there anything else I can do for you? No not today      Thank you, and have a great day.

## 2021-10-25 NOTE — PROGRESS NOTES
"Subjective:     Patient ID: Arjun Plaza is a 81 y.o. male.    Chief Complaint:  Left shoulder pain, new patient    History of Present Illness     Arjun Plaza presents to clinic today for evaluation of left shoulder pain. 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    The patient notes that he has been experiencing moderate left shoulder pain localized to the anterior and posterior joint line for about 4 years. He rates his pain as an  8 out of 10, stabbing in nature. It is accompanied by clicking and popping. His pain is aggravated with overhead movements and heavy lifting. The patient is here today to discuss the possibility of a corticosteroid injection improving his symptoms. He received an injection about 4 years and with relief.  He denies any numbness or tingling associated with his shoulder pain, but experiences radiating pain down at the arm joint.     The patient is right-handed. 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      Social History     Occupational History   • Not on file   Tobacco Use   • Smoking status: Never Smoker   • Smokeless tobacco: Never Used   Vaping Use   • Vaping Use: Never used   Substance and Sexual Activity   • Alcohol use: No   • Drug use: No   • Sexual activity: Defer      Past Medical History:   Diagnosis Date   • Hyperlipidemia    • Hypertension      Past Surgical History:   Procedure Laterality Date   • COLONOSCOPY  2014   • FOREARM SURGERY     • HAND SURGERY Right        Family History   Problem Relation Age of Onset   • Hypertension Father    • Diabetes Father    • Diabetes Sister    • Colon cancer Neg Hx    • Colon polyps Neg Hx          Review of Systems        Objective:  Vitals:    10/25/21 0859   BP: 176/80   Pulse: 80   Resp: 16   Weight: 82.4 kg (181 lb 9.6 oz)   Height: 177.8 cm (70\")         10/25/21  0859   Weight: 82.4 kg (181 lb 9.6 oz)     Body mass index is 26.06 " "kg/m².  Physical Exam    Vital signs reviewed.   General: No acute distress, alert and oriented  Eyes: conjunctiva clear; pupils equally round and reactive  ENT: external ears and nose atraumatic; oropharynx clear  CV: no peripheral edema  Resp: normal respiratory effort  Skin: no rashes or wounds; normal turgor  Psych: mood and affect appropriate; recent and remote memory intact          Ortho Exam       Left shoulder-    Tenderness  located anterior and posterior joint line    FF-   Active - 95 degrees   Passive - 120 degrees   Strength- 4/5  He does have significant crepitus glenohumeral joint line.    ER-      Active - 20 degrees   Passive - 25 degrees  Strength- 4+/5    IR        \"to the side\"   Belly Press - 4+/5 strength    Empty can test- Mildly positive     Drop arm test- Negative  Ext rotation lag sign - Negative     Neer's sign- Positive  Mao- Positive     Cross arm test- Mildly positive  Tenderness over AC joint- Mildly positive     Positive deltoid fire in all three components.  Brisk cap refill to all digits, 2+ palpable radial pulse    Imaging:  Left Shoulder X-Ray  Indication: Pain  AP, scapular Y, and axillary lateral views    Findings:  No fracture  No bony lesion  Normal soft tissues  Advanced glenohumeral arthritis with significant posterior wear as well as inferior osteophyte along medial proximal calcar of humerus and extending into the medial glenoid, mild evidence of humeral head elevation, moderate degenerative change at the AC joint as well.    No prior studies were available for comparison.    Assessment:        1. Left shoulder pain, unspecified chronicity    2. Primary localized osteoarthrosis of left shoulder region    3. AC joint arthropathy           Plan:  Large Joint Arthrocentesis: L glenohumeral  Date/Time: 10/25/2021 9:54 AM  Consent given by: patient  Site marked: site marked  Timeout: Immediately prior to procedure a time out was called to verify the correct patient, " procedure, equipment, support staff and site/side marked as required   Supporting Documentation  Indications: pain   Procedure Details  Location: shoulder - L glenohumeral  Preparation: Patient was prepped and draped in the usual sterile fashion  Needle size: 22 G  Approach: anterior  Medications administered: 80 mg triamcinolone acetonide 40 MG/ML; 4 mL lidocaine PF 1% 1 %  Patient tolerance: patient tolerated the procedure well with no immediate complications                1. Discussed treatment options at length with patient at today's visit including a left shoulder arthroplasty.  At this point in time, his limitations are not profound and he would like to continue with conservative treatment.  2. We did discuss the option for injection. He would like to proceed with that today.  3. Patient would like to proceed with cortisone injection today to the left shoulder glenohumeral joint. Recommended limited use of affected extremity for the next 24 hours to only essential activites other than work on general active and passive motion. Recommended supplementing with ice and soft tissue massage. Discussed with patient that they should see results in 5-7 days, if no improvement in 5-6 weeks I have asked them to call the office to review other options. Patient should call office immediately if they notice redness, warmth, fevers, chills, or residual numbness or tingling for greater than 6 hours after injection.   4. If his symptoms are not alleviated by the injection, unfortunately, we will need to discuss surgical intervention. 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   5. Follow up: 3 months.        Arjun Plaza was in agreement with plan and had all questions answered.     Orders:  Orders Placed This Encounter   Procedures   • Large Joint Arthrocentesis: L glenohumeral   • XR Shoulder 2+ View Left       Medications:  No orders of the defined types were placed in this  encounter.      Followup:  Return in about 3 months (around 1/25/2022).    Diagnoses and all orders for this visit:    1. Left shoulder pain, unspecified chronicity (Primary)  -     XR Shoulder 2+ View Left    2. Primary localized osteoarthrosis of left shoulder region    3. AC joint arthropathy    Other orders  -     Large Joint Arthrocentesis: L glenohumeral          Dictated utilizing Dragon dictation     Transcribed from ambient dictation for Jake Gallardo MD by Brandon Gonzalez.  10/25/21   11:10 EDT    I have personally performed the services described in this document as transcribed by the above individual, and it is both accurate and complete.  Jake Gallardo MD  10/25/2021  12:41 EDT

## 2021-11-29 DIAGNOSIS — E53.8 VITAMIN B12 DEFICIENCY: ICD-10-CM

## 2021-11-29 DIAGNOSIS — E78.00 PURE HYPERCHOLESTEROLEMIA: Primary | ICD-10-CM

## 2021-11-29 DIAGNOSIS — E55.9 VITAMIN D DEFICIENCY: ICD-10-CM

## 2021-11-30 LAB
25(OH)D3+25(OH)D2 SERPL-MCNC: 46.9 NG/ML (ref 30–100)
ALBUMIN SERPL-MCNC: 4.4 G/DL (ref 3.7–4.7)
ALBUMIN/GLOB SERPL: 1.7 {RATIO} (ref 1.2–2.2)
ALP SERPL-CCNC: 66 IU/L (ref 44–121)
ALT SERPL-CCNC: 14 IU/L (ref 0–44)
AST SERPL-CCNC: 19 IU/L (ref 0–40)
BILIRUB SERPL-MCNC: 0.4 MG/DL (ref 0–1.2)
BUN SERPL-MCNC: 25 MG/DL (ref 8–27)
BUN/CREAT SERPL: 27 (ref 10–24)
CALCIUM SERPL-MCNC: 9.5 MG/DL (ref 8.6–10.2)
CHLORIDE SERPL-SCNC: 102 MMOL/L (ref 96–106)
CHOLEST SERPL-MCNC: 186 MG/DL (ref 100–199)
CO2 SERPL-SCNC: 26 MMOL/L (ref 20–29)
CREAT SERPL-MCNC: 0.93 MG/DL (ref 0.76–1.27)
GLOBULIN SER CALC-MCNC: 2.6 G/DL (ref 1.5–4.5)
GLUCOSE SERPL-MCNC: 95 MG/DL (ref 65–99)
HDLC SERPL-MCNC: 40 MG/DL
LDLC SERPL CALC-MCNC: 113 MG/DL (ref 0–99)
POTASSIUM SERPL-SCNC: 5.4 MMOL/L (ref 3.5–5.2)
PROT SERPL-MCNC: 7 G/DL (ref 6–8.5)
SODIUM SERPL-SCNC: 142 MMOL/L (ref 134–144)
TRIGL SERPL-MCNC: 189 MG/DL (ref 0–149)
VIT B12 SERPL-MCNC: 561 PG/ML (ref 232–1245)
VLDLC SERPL CALC-MCNC: 33 MG/DL (ref 5–40)

## 2021-12-06 ENCOUNTER — OFFICE VISIT (OUTPATIENT)
Dept: FAMILY MEDICINE CLINIC | Facility: CLINIC | Age: 81
End: 2021-12-06

## 2021-12-06 VITALS
WEIGHT: 180.2 LBS | BODY MASS INDEX: 25.8 KG/M2 | OXYGEN SATURATION: 97 % | HEIGHT: 70 IN | DIASTOLIC BLOOD PRESSURE: 85 MMHG | SYSTOLIC BLOOD PRESSURE: 156 MMHG | TEMPERATURE: 98 F | HEART RATE: 86 BPM

## 2021-12-06 DIAGNOSIS — E78.00 PURE HYPERCHOLESTEROLEMIA: Chronic | ICD-10-CM

## 2021-12-06 DIAGNOSIS — E53.8 VITAMIN B12 DEFICIENCY: ICD-10-CM

## 2021-12-06 DIAGNOSIS — E55.9 VITAMIN D DEFICIENCY: ICD-10-CM

## 2021-12-06 DIAGNOSIS — K21.9 GASTROESOPHAGEAL REFLUX DISEASE, UNSPECIFIED WHETHER ESOPHAGITIS PRESENT: Chronic | ICD-10-CM

## 2021-12-06 DIAGNOSIS — Z00.00 MEDICARE ANNUAL WELLNESS VISIT, SUBSEQUENT: Primary | ICD-10-CM

## 2021-12-06 DIAGNOSIS — I10 ESSENTIAL HYPERTENSION: Chronic | ICD-10-CM

## 2021-12-06 PROCEDURE — 96160 PT-FOCUSED HLTH RISK ASSMT: CPT | Performed by: FAMILY MEDICINE

## 2021-12-06 PROCEDURE — 99214 OFFICE O/P EST MOD 30 MIN: CPT | Performed by: FAMILY MEDICINE

## 2021-12-06 PROCEDURE — 1160F RVW MEDS BY RX/DR IN RCRD: CPT | Performed by: FAMILY MEDICINE

## 2021-12-06 PROCEDURE — 1170F FXNL STATUS ASSESSED: CPT | Performed by: FAMILY MEDICINE

## 2021-12-06 PROCEDURE — G0439 PPPS, SUBSEQ VISIT: HCPCS | Performed by: FAMILY MEDICINE

## 2021-12-06 RX ORDER — SIMVASTATIN 20 MG
20 TABLET ORAL NIGHTLY
Qty: 90 TABLET | Refills: 1 | Status: SHIPPED | OUTPATIENT
Start: 2021-12-06 | End: 2022-06-14 | Stop reason: SDUPTHER

## 2021-12-06 RX ORDER — LISINOPRIL AND HYDROCHLOROTHIAZIDE 20; 12.5 MG/1; MG/1
1 TABLET ORAL DAILY
Qty: 90 TABLET | Refills: 1 | Status: SHIPPED | OUTPATIENT
Start: 2021-12-06 | End: 2021-12-21 | Stop reason: SDUPTHER

## 2021-12-06 RX ORDER — OMEPRAZOLE 20 MG/1
20 CAPSULE, DELAYED RELEASE ORAL EVERY OTHER DAY
Qty: 45 CAPSULE | Refills: 1 | Status: SHIPPED | OUTPATIENT
Start: 2021-12-06 | End: 2022-06-14 | Stop reason: SDUPTHER

## 2021-12-06 NOTE — PROGRESS NOTES
The ABCs of the Annual Wellness Visit  Subsequent Medicare Wellness Visit    Chief Complaint   Patient presents with   • Medicare Wellness-subsequent      Subjective    History of Present Illness:  Arjun Plaza is a 81 y.o. male who presents for a Subsequent Medicare Wellness Visit.    The following portions of the patient's history were reviewed and   updated as appropriate: allergies, current medications, past family history, past medical history, past social history, past surgical history and problem list.    Compared to one year ago, the patient feels his physical   health is the same.    Compared to one year ago, the patient feels his mental   health is the same.    Recent Hospitalizations:  He was not admitted to the hospital during the last year.       Current Medical Providers:  Patient Care Team:  Delon Reynolds MD as PCP - General (Family Medicine)  John Larson MD as PCP - Family Medicine    Outpatient Medications Prior to Visit   Medication Sig Dispense Refill   • cholecalciferol (VITAMIN D3) 1000 units tablet Take 1,000 Units by mouth Daily.     • EXTRA STRENGTH ACETAMINOPHEN PO      • lisinopril (PRINIVIL,ZESTRIL) 20 MG tablet Take 1 tablet by mouth Daily. 90 tablet 1   • multivitamin with minerals tablet tablet Take 1 tablet by mouth Daily.     • niacin 500 MG tablet Take 500 mg by mouth Every Night.     • omeprazole (PrilOSEC) 20 MG capsule Take 1 capsule by mouth Every Other Day. 90 capsule 0   • simvastatin (ZOCOR) 20 MG tablet Take 1 tablet by mouth Every Night. 90 tablet 1     No facility-administered medications prior to visit.       No opioid medication identified on active medication list. I have reviewed chart for other potential  high risk medication/s and harmful drug interactions in the elderly.          Aspirin is not on active medication list.  Aspirin use is not indicated based on review of current medical condition/s. Risk of harm outweighs potential benefits.   ".    Patient Active Problem List   Diagnosis   • Pure hypercholesterolemia   • Essential hypertension   • Vitamin B12 deficiency   • Vitamin D deficiency   • Gastroesophageal reflux disease   • Anosmia     Advance Care Planning  Advance Directive is not on file.  ACP discussion was held with the patient during this visit. Patient has an advance directive (not in EMR), copy requested.          Objective    Vitals:    12/06/21 0947   BP: 156/85   Pulse: 86   Temp: 98 °F (36.7 °C)   TempSrc: Temporal   SpO2: 97%   Weight: 81.7 kg (180 lb 3.2 oz)   Height: 177.8 cm (70\")     BMI Readings from Last 1 Encounters:   12/06/21 25.86 kg/m²   BMI is above normal parameters. Recommendations include: exercise counseling    Does the patient have evidence of cognitive impairment? No    Physical Exam  Lab Results   Component Value Date    CHLPL 186 11/29/2021    TRIG 189 (H) 11/29/2021    HDL 40 11/29/2021     (H) 11/29/2021    VLDL 33 11/29/2021            HEALTH RISK ASSESSMENT    Smoking Status:  Social History     Tobacco Use   Smoking Status Never Smoker   Smokeless Tobacco Never Used     Alcohol Consumption:  Social History     Substance and Sexual Activity   Alcohol Use No     Fall Risk Screen:    STEADI Fall Risk Assessment was completed, and patient is at LOW risk for falls.Assessment completed on:12/6/2021    Depression Screening:  PHQ-2/PHQ-9 Depression Screening 12/6/2021   Little interest or pleasure in doing things 0   Feeling down, depressed, or hopeless 0   Total Score 0       Health Habits and Functional and Cognitive Screening:  Functional & Cognitive Status 12/6/2021   Do you have difficulty preparing food and eating? No   Do you have difficulty bathing yourself, getting dressed or grooming yourself? No   Do you have difficulty using the toilet? No   Do you have difficulty moving around from place to place? No   Do you have trouble with steps or getting out of a bed or a chair? No   Current Diet Well " Balanced Diet   Dental Exam Up to date   Eye Exam Up to date   Exercise (times per week) 7 times per week   Current Exercises Include Walking   Current Exercise Activities Include -   Do you need help using the phone?  No   Are you deaf or do you have serious difficulty hearing?  Yes   Do you need help with transportation? No   Do you need help shopping? No   Do you need help preparing meals?  No   Do you need help with housework?  No   Do you need help with laundry? No   Do you need help taking your medications? No   Do you need help managing money? No   Do you ever drive or ride in a car without wearing a seat belt? No   Have you felt unusual stress, anger or loneliness in the last month? No   Who do you live with? Spouse   If you need help, do you have trouble finding someone available to you? No   Have you been bothered in the last four weeks by sexual problems? No   Do you have difficulty concentrating, remembering or making decisions? No       Age-appropriate Screening Schedule:  Refer to the list below for future screening recommendations based on patient's age, sex and/or medical conditions. Orders for these recommended tests are listed in the plan section. The patient has been provided with a written plan.    Health Maintenance   Topic Date Due   • ZOSTER VACCINE (2 of 2) 09/22/2017   • INFLUENZA VACCINE  08/01/2021   • LIPID PANEL  11/29/2022   • TDAP/TD VACCINES (3 - Td or Tdap) 07/28/2027              Assessment/Plan   CMS Preventative Services Quick Reference  Risk Factors Identified During Encounter  Immunizations Discussed/Encouraged (specific Immunizations; Influenza and Shingrix  The above risks/problems have been discussed with the patient.  Follow up actions/plans if indicated are seen below in the Assessment/Plan Section.  Pertinent information has been shared with the patient in the After Visit Summary.    Diagnoses and all orders for this visit:    1. Medicare annual wellness visit, subsequent  (Primary)    2. Essential hypertension    3. Gastroesophageal reflux disease, unspecified whether esophagitis present    4. Pure hypercholesterolemia        Follow Up:   No follow-ups on file.     An After Visit Summary and PPPS were made available to the patient.

## 2021-12-06 NOTE — PROGRESS NOTES
"Chief Complaint  Medicare Wellness-subsequent    Subjective          Arjun Plaza presents to [facility]  History of Present Illness    Hypertension follow-up.  Blood pressure remains elevated.  It was also elevated at the orthopedic doctors sometime back.  He does not check at home.  No cardiovascular symptoms.  No trouble with urination.  No significant nocturia.  Recent creatinine normal.  Potassium is running high the last few visits.  Not on a diuretic.  He states he was at 1 point on a potassium rich diet with a lot of bananas.    Hyperlipidemia.  He continues simvastatin 20 mg a without side effect.  The lipid panel is stable.  His liver enzymes are within normal limits.     GERD.  Continues do well with omeprazole 40 mg every other day.  Kidney function normal.    Vitamin B12 and vitamin D deficiency.  Continues on supplementation.  His levels are normal.    Objective   Vital Signs:   /85   Pulse 86   Temp 98 °F (36.7 °C) (Temporal)   Ht 177.8 cm (70\")   Wt 81.7 kg (180 lb 3.2 oz)   SpO2 97%   BMI 25.86 kg/m²     Physical Exam  Constitutional:       Appearance: Normal appearance.   HENT:      Head: Atraumatic.   Eyes:      Conjunctiva/sclera: Conjunctivae normal.   Neck:      Thyroid: No thyroid mass, thyromegaly or thyroid tenderness.   Cardiovascular:      Rate and Rhythm: Normal rate and regular rhythm.      Pulses: Normal pulses.      Heart sounds: Normal heart sounds.   Pulmonary:      Effort: Pulmonary effort is normal.      Breath sounds: Normal breath sounds.   Abdominal:      General: Abdomen is flat. There is no distension.      Palpations: Abdomen is soft. There is no mass.      Tenderness: There is no abdominal tenderness.      Hernia: No hernia is present.   Musculoskeletal:         General: Normal range of motion.      Cervical back: Normal range of motion and neck supple. No muscular tenderness.   Lymphadenopathy:      Cervical: No cervical " adenopathy.   Skin:     General: Skin is warm and dry.      Findings: No rash.   Neurological:      General: No focal deficit present.      Mental Status: He is alert and oriented to person, place, and time.   Psychiatric:         Mood and Affect: Mood normal.         Behavior: Behavior normal.        Result Review :   The following data was reviewed by: Delon Reynolds MD on 12/06/2021:  Common labs    Common Labsle 5/27/21 5/27/21 11/29/21 11/29/21    1013 1013 0943 0943   Glucose 102 (A)  95    BUN 19  25    Creatinine 1.03  0.93    eGFR Non  Am 69  77    eGFR African Am 84  89    Sodium 143  142    Potassium 5.1  5.4 (A)    Chloride 103  102    Calcium 10.0  9.5    Total Protein 7.3  7.0    Albumin 4.90  4.4    Total Bilirubin 0.4  0.4    Alkaline Phosphatase 68  66    AST (SGOT) 19  19    ALT (SGPT) 15  14    Total Cholesterol  159  186   Triglycerides  156 (A)  189 (A)   HDL Cholesterol  37 (A)  40   LDL Cholesterol   95  113 (A)   (A) Abnormal value       Comments are available for some flowsheets but are not being displayed.                     Assessment and Plan    Diagnoses and all orders for this visit:    1. Medicare annual wellness visit, subsequent (Primary)    2. Essential hypertension    3. Gastroesophageal reflux disease, unspecified whether esophagitis present    4. Pure hypercholesterolemia    5. Vitamin B12 deficiency    6. Vitamin D deficiency    Other orders  -     lisinopril-hydrochlorothiazide (Zestoretic) 20-12.5 MG per tablet; Take 1 tablet by mouth Daily.  Dispense: 90 tablet; Refill: 1  -     simvastatin (ZOCOR) 20 MG tablet; Take 1 tablet by mouth Every Night.  Dispense: 90 tablet; Refill: 1  -     omeprazole (PrilOSEC) 20 MG capsule; Take 1 capsule by mouth Every Other Day.  Dispense: 45 capsule; Refill: 1      Hypertension.  Needs better control.  Switching from lisinopril 20 mg a day to lisinopril 20/hydrochlorothiazide 12.5 mg a day.  This will help his blood pressure and  also help balance out his potassium better.  Is been running high to borderline high for a number of months now.  Side effects including increased urination discussed.  With trouble with let us know otherwise I will see him back in 6 months for recheck with lab work prior.    Hyperlipidemia.  Continue simvastatin.    And B12 vitamin D deficiency.  Continue supplementation.  Recent labs are normal.    GERD.  He continues omeprazole every other day without side effects.  Refills given today on medication.      Follow Up   No follow-ups on file.  Patient was given instructions and counseling regarding his condition or for health maintenance advice. Please see specific information pulled into the AVS if appropriate.

## 2021-12-21 RX ORDER — LISINOPRIL AND HYDROCHLOROTHIAZIDE 20; 12.5 MG/1; MG/1
1 TABLET ORAL DAILY
Qty: 90 TABLET | Refills: 1 | Status: SHIPPED | OUTPATIENT
Start: 2021-12-21 | End: 2021-12-22

## 2021-12-21 NOTE — TELEPHONE ENCOUNTER
Caller: Catherine Plaza    Relationship: Emergency Contact    Best call back number: [phone]    Requested Prescriptions:   Requested Prescriptions     Pending Prescriptions Disp Refills   • lisinopril-hydrochlorothiazide (Zestoretic) 20-12.5 MG per tablet 90 tablet 1     Sig: Take 1 tablet by mouth Daily.        Pharmacy where request should be sent: [facility] - [address] [phone] [facility] [phone] FX     Additional details provided by patient: PATIENT CALLING IN REFILL.PATIENT CALLING IN REGARDS TO SPEAK TO DR POTTER ABOUT HIS TAKING THE lisinopril-hydrochlorothiazide (Zestoretic) 20-12.5 MG per tablet . PATIENT STATES THAT IT CAN INCREASE THE RISK OF SKIN CANCER. PLEASE ADVISE THANK YOU!   PLEASE ADVISE THANK YOU!    Does the patient have less than a 3 day supply:  [] Yes  [x] No    Abby Munoz Rep   12/21/21 13:40 EST

## 2021-12-22 ENCOUNTER — TELEPHONE (OUTPATIENT)
Dept: FAMILY MEDICINE CLINIC | Facility: CLINIC | Age: 81
End: 2021-12-22

## 2021-12-22 RX ORDER — LISINOPRIL 20 MG/1
20 TABLET ORAL DAILY
Qty: 90 TABLET | Refills: 1 | Status: SHIPPED | OUTPATIENT
Start: 2021-12-22 | End: 2022-06-14 | Stop reason: SDUPTHER

## 2021-12-22 NOTE — TELEPHONE ENCOUNTER
Caller: Catherine Plaza    Relationship: Emergency Contact    Best call back number: [phone]    What medication are you requesting: REGULAR LISINOPRIL, NOT THE HCTZ    Have you had these symptoms before:    [x] Yes  [] No    Have you been treated for these symptoms before:   [x] Yes  [] No    If a prescription is needed, what is your preferred pharmacy and phone number: [facility] [address] [phone] [facility] [phone] FX     Additional notes: PLEASE SEND IN NEW PRESCRIPTION, WRONG KIND OF MEDICATION WAS SENT IN. PLEASE CALL PATIENT'S WIFE TO ADVISE WHEN AND WHAT IS SENT.

## 2022-03-02 ENCOUNTER — OFFICE VISIT (OUTPATIENT)
Dept: ORTHOPEDIC SURGERY | Facility: CLINIC | Age: 82
End: 2022-03-02

## 2022-03-02 VITALS — WEIGHT: 170 LBS | BODY MASS INDEX: 24.34 KG/M2 | HEIGHT: 70 IN

## 2022-03-02 DIAGNOSIS — M25.551 RIGHT HIP PAIN: ICD-10-CM

## 2022-03-02 DIAGNOSIS — M19.019 AC JOINT ARTHROPATHY: ICD-10-CM

## 2022-03-02 DIAGNOSIS — M19.012 PRIMARY LOCALIZED OSTEOARTHROSIS OF LEFT SHOULDER REGION: Primary | ICD-10-CM

## 2022-03-02 DIAGNOSIS — M16.11 PRIMARY OSTEOARTHRITIS OF RIGHT HIP: ICD-10-CM

## 2022-03-02 PROCEDURE — 73502 X-RAY EXAM HIP UNI 2-3 VIEWS: CPT | Performed by: ORTHOPAEDIC SURGERY

## 2022-03-02 PROCEDURE — 99214 OFFICE O/P EST MOD 30 MIN: CPT | Performed by: ORTHOPAEDIC SURGERY

## 2022-03-02 NOTE — PROGRESS NOTES
Subjective:     Patient ID: Arjun Plaza is a 82 y.o. male.    Chief Complaint:  Follow-up left shoulder pain, glenohumeral arthritis  Last injection-10/25/2021-left shoulder glenohumeral joint-cortisone    History of Present Illness  Arjun Plaza returns to clinic today for evaluation of left shoulder pain and new complaint of right hip pain.    The patient's left shoulder is doing fairly well at this point. He notes some mild residual pain, particularly with overhead lifting, pushing, and pulling, as well as some residual stiffness, but he had approximately 70 to 80 percent relief of his pain with prior glenohumeral injection on the left side. He states his pain will get up to a 3 to 4 out of 10 with other activities. He denies numbness or tingling. He denies radiation of the pain.    The patient is making a new complaint today of right hip pain that has been present for the last 4 to 6 weeks. It has been intermittent in nature, but localized primarily to the groin region with minimal irritation laterally. He rates it as a 7 to 8 out of 10, occasionally sharp in nature, worse with walking and activities. He has minimal improvement with activity modification, anti-inflammatory medication, and stretching. He denies numbness or tingling. He denies radiation of pain. Again, the pain is localized anterior to the groin region with minimal irritation laterally.     Social History     Occupational History   • Not on file   Tobacco Use   • Smoking status: Never Smoker   • Smokeless tobacco: Never Used   Vaping Use   • Vaping Use: Never used   Substance and Sexual Activity   • Alcohol use: No   • Drug use: No   • Sexual activity: Defer      Past Medical History:   Diagnosis Date   • Hyperlipidemia    • Hypertension      Past Surgical History:   Procedure Laterality Date   • COLONOSCOPY  2014   • FOREARM SURGERY     • HAND SURGERY Right        Family History   Problem Relation Age of Onset   • Hypertension Father   "  • Diabetes Father    • Diabetes Sister    • Colon cancer Neg Hx    • Colon polyps Neg Hx          Review of Systems        Objective:  Vitals:    03/02/22 0850   Weight: 77.1 kg (170 lb)   Height: 177.8 cm (70\")         03/02/22  0850   Weight: 77.1 kg (170 lb)     Body mass index is 24.39 kg/m².  General: No acute distress.  Resp: normal respiratory effort  Skin: no rashes or wounds; normal turgor  Psych: mood and affect appropriate; recent and remote memory intact          Ortho Exam       Left shoulder-     Tenderness  located anterior and posterior joint line     FF-       Active -120 degrees   Passive -130 degrees   Strength- 4/5  He does have significant crepitus glenohumeral joint line.     ER-      Active -25 degrees   Passive -30 degrees  Strength- 4+/5     IR        \"to the side\"   Belly Press - 4+/5 strength     Empty can test- Mildly positive     Drop arm test- Negative  Ext rotation lag sign - Negative     Neer's sign- Positive  Mao- Positive     Cross arm test- Mildly positive  Tenderness over AC joint- Mildly positive     Positive deltoid fire in all three components.  Brisk cap refill to all digits, 2+ palpable radial pulse    Right hip-    Maximal tenderness in the groin region.  Flexion 95 degrees, 4+/5 strength  Extension 0 degrees, 4+/5 strength  ER 45 degrees, 4+/5 strength  IR 20 degrees, 4+/5 strength  Nettie's test- Negative  Log roll - Positive  Stinchfield - Positive  No tenderness over trochanteric bursa    Positive sensation light touch all distributions symmetric to contralateral side  Brisk cap refill all digits  1+ dorsalis pedis pulse    Imaging:  None today  Assessment:        1. Primary localized osteoarthrosis of left shoulder region    2. AC joint arthropathy    3. Right hip pain    4. Primary osteoarthritis of right hip           Plan:          1. Discussed treatment options at length with patient at today's visit.   2. We will continue with stretching, strengthening " exercises, and home activities for his left shoulder.  3. If he gets significant recurrence of pain in the left shoulder, patient can call us back for return visit and discuss options including but not limited to shoulder arthroplasty and repeat injection.  4. I reviewed with patient findings from the x-rays of his hip indicating advanced arthritic change. At this point in time, given his pain and failure of other conservative treatment, he would like to proceed with hip ultrasound.  5. We did discuss the possibility for total hip arthroplasty, but he wants to hold off on surgical treatment.  6. Follow-up as needed.      Arjun Plaza was in agreement with plan and had all questions answered.     Orders:  Orders Placed This Encounter   Procedures   • XR Hip With or Without Pelvis 2 - 3 View Right   • Ambulatory Referral to Sports Medicine       Medications:  No orders of the defined types were placed in this encounter.      Followup:  Return if symptoms worsen or fail to improve.    Diagnoses and all orders for this visit:    1. Primary localized osteoarthrosis of left shoulder region (Primary)    2. AC joint arthropathy    3. Right hip pain  -     XR Hip With or Without Pelvis 2 - 3 View Right  -     Ambulatory Referral to Sports Medicine    4. Primary osteoarthritis of right hip  -     Ambulatory Referral to Sports Medicine          Dictated utilizing Dragon dictation     Transcribed from ambient dictation for Jake Gallardo MD by Haydee Jaquez.  03/02/22   16:12 EST    Patient verbalized consent to the visit recording.  I have personally performed the services described in this document as transcribed by the above individual, and it is both accurate and complete.  Jake Gallardo MD  3/12/2022  19:16 EST

## 2022-03-14 ENCOUNTER — OFFICE VISIT (OUTPATIENT)
Dept: SPORTS MEDICINE | Facility: CLINIC | Age: 82
End: 2022-03-14

## 2022-03-14 VITALS
HEIGHT: 70 IN | SYSTOLIC BLOOD PRESSURE: 166 MMHG | RESPIRATION RATE: 16 BRPM | OXYGEN SATURATION: 96 % | WEIGHT: 170 LBS | HEART RATE: 94 BPM | BODY MASS INDEX: 24.34 KG/M2 | DIASTOLIC BLOOD PRESSURE: 80 MMHG | TEMPERATURE: 98.1 F

## 2022-03-14 DIAGNOSIS — M16.11 PRIMARY OSTEOARTHRITIS OF RIGHT HIP: Primary | ICD-10-CM

## 2022-03-14 PROCEDURE — 20611 DRAIN/INJ JOINT/BURSA W/US: CPT | Performed by: FAMILY MEDICINE

## 2022-03-14 RX ORDER — TRIAMCINOLONE ACETONIDE 40 MG/ML
40 INJECTION, SUSPENSION INTRA-ARTICULAR; INTRAMUSCULAR ONCE
Status: COMPLETED | OUTPATIENT
Start: 2022-03-14 | End: 2022-03-14

## 2022-03-14 RX ADMIN — TRIAMCINOLONE ACETONIDE 40 MG: 40 INJECTION, SUSPENSION INTRA-ARTICULAR; INTRAMUSCULAR at 15:19

## 2022-03-16 ENCOUNTER — PATIENT ROUNDING (BHMG ONLY) (OUTPATIENT)
Dept: SPORTS MEDICINE | Facility: CLINIC | Age: 82
End: 2022-03-16

## 2022-03-16 NOTE — PROGRESS NOTES
03/16/2022    A [employer] message has been sent to the patient for PATIENT ROUNDING with [facility]

## 2022-03-20 NOTE — PROGRESS NOTES
"Here today for right hip injection requested by Dr. Gallardo.    Ultrasound-Guided Hip Injection Procedure Note    Right hip injection was discussed with the patient in detail, including indication, risks, benefits, and alternatives. Verbal consent was given for the procedure. Injection was performed by physician.  Injection site was identified by ultrasound examination, then cleaned with Betadine and alcohol swabs. Prior to needle insertion, ethyl chloride spray was used for surface anesthesia. Sterile technique was used. Ultrasound guidance was indicated due to proximity of neurovascular structures and injection accuracy.  A 22-gauge, 3.5\" spinal needle was guided to the anterior joint capsule under continuous direct ultrasound visualization. Injectate was seen filing the capsule and passed without difficulty. The needle was removed and a simple bandage was applied. The procedure was tolerated well without difficulty.    Injection mixture:  1% lidocaine without epinephrine: 2 mL  40 mg/mL triamcinolone acetonide: 1 mL       Diagnoses and all orders for this visit:    Primary osteoarthritis of right hip  -     triamcinolone acetonide (KENALOG-40) injection 40 mg        "

## 2022-06-06 DIAGNOSIS — E55.9 VITAMIN D DEFICIENCY: ICD-10-CM

## 2022-06-06 DIAGNOSIS — E53.8 VITAMIN B12 DEFICIENCY: ICD-10-CM

## 2022-06-06 DIAGNOSIS — I10 ESSENTIAL HYPERTENSION: Primary | ICD-10-CM

## 2022-06-06 DIAGNOSIS — E78.00 PURE HYPERCHOLESTEROLEMIA: ICD-10-CM

## 2022-06-07 LAB
25(OH)D3+25(OH)D2 SERPL-MCNC: 58.3 NG/ML (ref 30–100)
ALBUMIN SERPL-MCNC: 4.7 G/DL (ref 3.6–4.6)
ALBUMIN/GLOB SERPL: 1.8 {RATIO} (ref 1.2–2.2)
ALP SERPL-CCNC: 82 IU/L (ref 44–121)
ALT SERPL-CCNC: 12 IU/L (ref 0–44)
AST SERPL-CCNC: 18 IU/L (ref 0–40)
BASOPHILS # BLD AUTO: 0 X10E3/UL (ref 0–0.2)
BASOPHILS NFR BLD AUTO: 0 %
BILIRUB SERPL-MCNC: 0.5 MG/DL (ref 0–1.2)
BUN SERPL-MCNC: 20 MG/DL (ref 8–27)
BUN/CREAT SERPL: 20 (ref 10–24)
CALCIUM SERPL-MCNC: 10 MG/DL (ref 8.6–10.2)
CHLORIDE SERPL-SCNC: 103 MMOL/L (ref 96–106)
CHOLEST SERPL-MCNC: 187 MG/DL (ref 100–199)
CO2 SERPL-SCNC: 24 MMOL/L (ref 20–29)
CREAT SERPL-MCNC: 1.02 MG/DL (ref 0.76–1.27)
EGFRCR SERPLBLD CKD-EPI 2021: 74 ML/MIN/1.73
EOSINOPHIL # BLD AUTO: 0.2 X10E3/UL (ref 0–0.4)
EOSINOPHIL NFR BLD AUTO: 3 %
ERYTHROCYTE [DISTWIDTH] IN BLOOD BY AUTOMATED COUNT: 12.7 % (ref 11.6–15.4)
GLOBULIN SER CALC-MCNC: 2.6 G/DL (ref 1.5–4.5)
GLUCOSE SERPL-MCNC: 100 MG/DL (ref 65–99)
HCT VFR BLD AUTO: 43.3 % (ref 37.5–51)
HDLC SERPL-MCNC: 36 MG/DL
HGB BLD-MCNC: 14.5 G/DL (ref 13–17.7)
IMM GRANULOCYTES # BLD AUTO: 0 X10E3/UL (ref 0–0.1)
IMM GRANULOCYTES NFR BLD AUTO: 0 %
LDLC SERPL CALC-MCNC: 114 MG/DL (ref 0–99)
LDLC/HDLC SERPL: 3.2 RATIO (ref 0–3.6)
LYMPHOCYTES # BLD AUTO: 2.3 X10E3/UL (ref 0.7–3.1)
LYMPHOCYTES NFR BLD AUTO: 33 %
MCH RBC QN AUTO: 30 PG (ref 26.6–33)
MCHC RBC AUTO-ENTMCNC: 33.5 G/DL (ref 31.5–35.7)
MCV RBC AUTO: 90 FL (ref 79–97)
MONOCYTES # BLD AUTO: 0.6 X10E3/UL (ref 0.1–0.9)
MONOCYTES NFR BLD AUTO: 9 %
NEUTROPHILS # BLD AUTO: 3.8 X10E3/UL (ref 1.4–7)
NEUTROPHILS NFR BLD AUTO: 55 %
PLATELET # BLD AUTO: 259 X10E3/UL (ref 150–450)
POTASSIUM SERPL-SCNC: 5.5 MMOL/L (ref 3.5–5.2)
PROT SERPL-MCNC: 7.3 G/DL (ref 6–8.5)
RBC # BLD AUTO: 4.83 X10E6/UL (ref 4.14–5.8)
SODIUM SERPL-SCNC: 143 MMOL/L (ref 134–144)
T4 FREE SERPL-MCNC: 1.26 NG/DL (ref 0.82–1.77)
TRIGL SERPL-MCNC: 211 MG/DL (ref 0–149)
TSH SERPL DL<=0.005 MIU/L-ACNC: 0.83 UIU/ML (ref 0.45–4.5)
VIT B12 SERPL-MCNC: 752 PG/ML (ref 232–1245)
VLDLC SERPL CALC-MCNC: 37 MG/DL (ref 5–40)
WBC # BLD AUTO: 7 X10E3/UL (ref 3.4–10.8)

## 2022-06-13 ENCOUNTER — OFFICE VISIT (OUTPATIENT)
Dept: FAMILY MEDICINE CLINIC | Facility: CLINIC | Age: 82
End: 2022-06-13

## 2022-06-13 VITALS
WEIGHT: 168 LBS | TEMPERATURE: 97.1 F | OXYGEN SATURATION: 98 % | HEART RATE: 76 BPM | DIASTOLIC BLOOD PRESSURE: 76 MMHG | SYSTOLIC BLOOD PRESSURE: 142 MMHG | BODY MASS INDEX: 24.05 KG/M2 | HEIGHT: 70 IN

## 2022-06-13 DIAGNOSIS — E53.8 VITAMIN B12 DEFICIENCY: Chronic | ICD-10-CM

## 2022-06-13 DIAGNOSIS — I10 ESSENTIAL HYPERTENSION: Primary | Chronic | ICD-10-CM

## 2022-06-13 DIAGNOSIS — E78.00 PURE HYPERCHOLESTEROLEMIA: Chronic | ICD-10-CM

## 2022-06-13 PROCEDURE — 99214 OFFICE O/P EST MOD 30 MIN: CPT | Performed by: FAMILY MEDICINE

## 2022-06-13 RX ORDER — HYDROCHLOROTHIAZIDE 12.5 MG/1
12.5 TABLET ORAL DAILY
Qty: 90 TABLET | Refills: 1 | Status: SHIPPED | OUTPATIENT
Start: 2022-06-13 | End: 2022-10-13

## 2022-06-13 NOTE — PROGRESS NOTES
"Chief Complaint  Hypertension    Subjective        Arjun Plaza presents to [facility]  History of Present Illness     Hypertension.  Last visit blood pressure was elevated.  Creatinine normal.  Potassium slightly high.  No change now.  I added hydrochlorothiazide to his 20 mg lisinopril last visit.  But his wife did not want him taking it.  She misunderstood what it was for.  He never started the hydrochlorothiazide.  He feels fine.  No cardiovascular symptoms.  He has no significant nocturia.    Hyperlipidemia.  He continues on long-term simvastatin 40 mg a day without side effect.  Lipid is overall favorable.    Vitamin B12 and vitamin D deficiency.  Removal.  Levels normal.    GERD.  Continues omeprazole.  Every other day.  No complaints.    Objective   Vital Signs:  /76   Pulse 76   Temp 97.1 °F (36.2 °C) (Temporal)   Ht 177.8 cm (70\")   Wt 76.2 kg (168 lb)   SpO2 98%   BMI 24.11 kg/m²   Estimated body mass index is 24.11 kg/m² as calculated from the following:    Height as of this encounter: 177.8 cm (70\").    Weight as of this encounter: 76.2 kg (168 lb).    BMI is within normal parameters. No other follow-up for BMI required.      Physical Exam  Vitals and nursing note reviewed.   Constitutional:       General: He is not in acute distress.     Appearance: He is well-developed.   Cardiovascular:      Rate and Rhythm: Normal rate and regular rhythm.      Heart sounds: Normal heart sounds.   Pulmonary:      Effort: Pulmonary effort is normal.      Breath sounds: Normal breath sounds.   Musculoskeletal:      Cervical back: Normal range of motion.   Skin:     General: Skin is warm and dry.   Psychiatric:         Mood and Affect: Mood normal.        Result Review :  The following data was reviewed by: Delon Reynolds MD on 06/13/2022:  Common labs    Common Labsle 11/29/21 11/29/21 6/6/22 6/6/22 6/6/22    0943 0943 0959 0959 0959   Glucose 95   100 (A)    BUN 25   " 20    Creatinine 0.93   1.02    eGFR Non  Am 77       eGFR African Am 89       Sodium 142   143    Potassium 5.4 (A)   5.5 (A)    Chloride 102   103    Calcium 9.5   10.0    Total Protein 7.0   7.3    Albumin 4.4   4.7 (A)    Total Bilirubin 0.4   0.5    Alkaline Phosphatase 66   82    AST (SGOT) 19   18    ALT (SGPT) 14   12    WBC     7.0   Hemoglobin     14.5   Hematocrit     43.3   Platelets     259   Total Cholesterol  186 187     Triglycerides  189 (A) 211 (A)     HDL Cholesterol  40 36 (A)     LDL Cholesterol   113 (A) 114 (A)     (A) Abnormal value       Comments are available for some flowsheets but are not being displayed.                     Assessment and Plan   Diagnoses and all orders for this visit:    1. Essential hypertension (Primary)  -     Comprehensive Metabolic Panel; Future  -     Lipid Panel; Future    2. Pure hypercholesterolemia  -     Comprehensive Metabolic Panel; Future  -     Lipid Panel; Future    3. Vitamin B12 deficiency  -     CBC & Differential; Future    Other orders  -     hydroCHLOROthiazide (HYDRODIURIL) 12.5 MG tablet; Take 1 tablet by mouth Daily.  Dispense: 90 tablet; Refill: 1      Hypertension.  Needs better control.  He has very mild hyperkalemia.  Continue lisinopril 20 mg a day.  Add hydrochlorothiazide 12.5 mg daily.  Patient and wife aware what exactly this medications for.  She thought it was for urinary problems.  It is not.  Continue to monitor blood pressure.  I will see him back in 6 months.  Sooner as needed.  Medicare wellness visit then.    Hyperlipidemia.  Continue simvastatin.  Lipid panel stable.    B12 deficiency.  He continues on B12 supplementation.  Recent vitamin B12 level and vitamin D level normal.  Check CBC prior to next visit.    GERD.  Continue omeprazole every other day.           Follow Up   No follow-ups on file.  Patient was given instructions and counseling regarding his condition or for health maintenance advice. Please see specific  information pulled into the AVS if appropriate.

## 2022-06-14 RX ORDER — OMEPRAZOLE 20 MG/1
20 CAPSULE, DELAYED RELEASE ORAL EVERY OTHER DAY
Qty: 90 CAPSULE | Refills: 1 | Status: SHIPPED | OUTPATIENT
Start: 2022-06-14

## 2022-06-14 RX ORDER — SIMVASTATIN 20 MG
20 TABLET ORAL NIGHTLY
Qty: 90 TABLET | Refills: 1 | Status: SHIPPED | OUTPATIENT
Start: 2022-06-14

## 2022-06-14 RX ORDER — LISINOPRIL 20 MG/1
20 TABLET ORAL DAILY
Qty: 90 TABLET | Refills: 1 | Status: SHIPPED | OUTPATIENT
Start: 2022-06-14

## 2022-06-14 NOTE — TELEPHONE ENCOUNTER
Caller: BolaDaliaa    Relationship: Emergency Contact    Best call back number: [phone]  Requested Prescriptions:   Requested Prescriptions     Pending Prescriptions Disp Refills   • simvastatin (ZOCOR) 20 MG tablet 90 tablet 1     Sig: Take 1 tablet by mouth Every Night.   • omeprazole (PrilOSEC) 20 MG capsule 45 capsule 1     Sig: Take 1 capsule by mouth Every Other Day.   • lisinopril (PRINIVIL,ZESTRIL) 20 MG tablet 90 tablet 1     Sig: Take 1 tablet by mouth Daily.        Pharmacy where request should be sent: [facility] [address] [phone] [facility] [phone] FX       Does the patient have less than a 3 day supply:  [x] Yes  [] No    Abby Laguna Rep   06/14/22 12:16 EDT

## 2022-06-14 NOTE — TELEPHONE ENCOUNTER
Rx Refill Note  Requested Prescriptions     Pending Prescriptions Disp Refills   • simvastatin (ZOCOR) 20 MG tablet 90 tablet 1     Sig: Take 1 tablet by mouth Every Night.   • omeprazole (PrilOSEC) 20 MG capsule 90 capsule 1     Sig: Take 1 capsule by mouth Every Other Day.   • lisinopril (PRINIVIL,ZESTRIL) 20 MG tablet 90 tablet 1     Sig: Take 1 tablet by mouth Daily.      Last office visit with prescribing clinician: 6/13/2022      Next office visit with prescribing clinician: 12/21/2022            Keisha Molina  06/14/22, 14:12 EDT

## 2022-06-16 ENCOUNTER — TELEPHONE (OUTPATIENT)
Dept: FAMILY MEDICINE CLINIC | Facility: CLINIC | Age: 82
End: 2022-06-16

## 2022-06-16 NOTE — TELEPHONE ENCOUNTER
Caller: Catherine Plaza    Relationship: Emergency Contact    Best call back number:[phone]       What is the best time to reach you: ANYTIME     Who are you requesting to speak with (clinical staff, provider,  specific staff member): CLINICAL STAFF         What was the call regarding: PATIENT IS WANTING TO LET KNOW DR POTTER KNOW THAT HE DOES NOT WANT TO TAKE THE  hydroCHLOROthiazide (HYDRODIURIL) 12.5 MG tablet    PATIENT DID NOT EXPLAIN WHY      Do you require a callback: YES

## 2022-06-16 NOTE — TELEPHONE ENCOUNTER
I will need to see him in the office for further evaluation if he is not going to take hydrochlorothiazide.  His blood pressure is too high.

## 2022-09-12 ENCOUNTER — OFFICE VISIT (OUTPATIENT)
Dept: ORTHOPEDIC SURGERY | Facility: CLINIC | Age: 82
End: 2022-09-12

## 2022-09-12 VITALS — HEIGHT: 70 IN | WEIGHT: 168 LBS | BODY MASS INDEX: 24.05 KG/M2

## 2022-09-12 DIAGNOSIS — M16.11 PRIMARY OSTEOARTHRITIS OF RIGHT HIP: Primary | ICD-10-CM

## 2022-09-12 PROCEDURE — 99213 OFFICE O/P EST LOW 20 MIN: CPT | Performed by: ORTHOPAEDIC SURGERY

## 2022-09-12 NOTE — PROGRESS NOTES
Subjective:     Patient ID: Arjun Plaza is a 82 y.o. male.    Chief Complaint:  Follow-up right hip pain, DJD  Plan last visit-ultrasound-guided hip injection    History of Present Illness     The patient has consented to the use of TAYLOR.    Arjun Plaza returns to clinic today for evaluation of right hip pain.    The patient reports moderate to severe pain over the anterior aspect of his right hip down to the groin region. He rates his pain 8 to 9 out of 10, sharp in nature, with occasional aching pain, particularly when he has been seated for a long period of time or when he goes to get up from a seated position. He denies associated numbness or tingling. The patient had limited short term improvement with ultrasound-guided injection to the right hip.    The patient notes that his right hip pain radiates down his right leg. The patient states that his right hip pain is worst in the right inguinal region of his body. The patient conveys that he is unsure whether or not the injection he received was helpful. The patient reports that his right hip pain is occasionally worse at night and notes that getting out of a chair is difficult.     Social History     Occupational History   • Not on file   Tobacco Use   • Smoking status: Never Smoker   • Smokeless tobacco: Never Used   Vaping Use   • Vaping Use: Never used   Substance and Sexual Activity   • Alcohol use: No   • Drug use: Yes   • Sexual activity: Yes     Partners: Female      Past Medical History:   Diagnosis Date   • Arthritis    • Cancer (HCC) basal cell   • GERD (gastroesophageal reflux disease)    • HL (hearing loss)    • Hyperlipidemia    • Hypertension      Past Surgical History:   Procedure Laterality Date   • COLONOSCOPY  2014   • FOREARM SURGERY     • HAND SURGERY Right    • VASECTOMY         Family History   Problem Relation Age of Onset   • Hypertension Father    • Diabetes Father    • Diabetes Sister    • Colon cancer Neg Hx    • Colon  "polyps Neg Hx          Review of Systems   Constitutional: Negative for chills, diaphoresis, fever and unexpected weight change.   HENT: Negative for hearing loss, nosebleeds, sore throat and tinnitus.    Eyes: Negative for pain and visual disturbance.   Respiratory: Negative for cough, shortness of breath and wheezing.    Cardiovascular: Negative for chest pain and palpitations.   Gastrointestinal: Negative for abdominal pain, diarrhea, nausea and vomiting.   Endocrine: Negative for cold intolerance, heat intolerance and polydipsia.   Genitourinary: Negative for difficulty urinating, dysuria and hematuria.   Musculoskeletal: Negative for arthralgias, joint swelling and myalgias.   Skin: Negative for rash and wound.   Allergic/Immunologic: Negative for environmental allergies.   Neurological: Negative for dizziness, syncope and numbness.   Hematological: Does not bruise/bleed easily.   Psychiatric/Behavioral: Negative for dysphoric mood and sleep disturbance. The patient is not nervous/anxious.          Objective:  Vitals:    09/12/22 1334   Weight: 76.2 kg (168 lb)   Height: 177.8 cm (70\")         09/12/22  1334   Weight: 76.2 kg (168 lb)     Body mass index is 24.11 kg/m².  Physical Exam    Vital signs reviewed.   General: No acute distress, alert and oriented  Eyes: conjunctiva clear; pupils equally round and reactive  ENT: external ears and nose atraumatic; oropharynx clear  CV: no peripheral edema  Resp: normal respiratory effort  Skin: no rashes or wounds; normal turgor  Psych: mood and affect appropriate; recent and remote memory intact      Ortho Exam     Right hip-     Maximal tenderness in the groin region.   Flexion 95 degrees, 4+/5 strength   Extension 0 degrees, 4+/5 strength   ER 45 degrees, 4+/5 strength   IR 20 degrees, 4+/5 strength   Nettie's test- Negative      Log roll - Positive   Stinchfield - Positive   No tenderness over trochanteric bursa     Positive sensation light touch all distributions " symmetric to contralateral side   Brisk cap refill all digits   1+ dorsalis pedis pulse    Imaging:    Review again prior x-ray right hip indicates moderate to advanced femoral acetabular joint space narrowing with mild reactive osteophyte formation particular along lateral superior acetabulum as well as femoral neck    Assessment:        1. Primary osteoarthritis of right hip           Plan:      1. Discussed treatment options at length with patient at today's visit. Discussed treatment options at length with patient at today's visit. At this time, he does have advanced degenerative changes to his right hip. He has failed intra-articular injection and activity modification. He is having significant pain with his activities of daily living. He would like to proceed with surgical treatment to his right hip at this time. I will refer him to my partner Dr. Mendoza for further consideration of right total hip arthroplasty.      Arjun Plaza was in agreement with plan and had all questions answered.     Orders:  No orders of the defined types were placed in this encounter.      Medications:  No orders of the defined types were placed in this encounter.      Followup:  Return if symptoms worsen or fail to improve.    Diagnoses and all orders for this visit:    1. Primary osteoarthritis of right hip (Primary)          Dictated utilizing Dragon dictation   Transcribed from ambient dictation for Jake Gallardo MD by Carie Van.   09/12/22   17:59 EDT    Patient verbalized consent to the visit recording.  I have personally performed the services described in this document as transcribed by the above individual, and it is both accurate and complete.  Jake Gallardo MD  9/26/2022  08:11 EDT

## 2022-09-15 ENCOUNTER — PREP FOR SURGERY (OUTPATIENT)
Dept: OTHER | Facility: HOSPITAL | Age: 82
End: 2022-09-15

## 2022-09-15 ENCOUNTER — OFFICE VISIT (OUTPATIENT)
Dept: ORTHOPEDIC SURGERY | Facility: CLINIC | Age: 82
End: 2022-09-15

## 2022-09-15 VITALS
WEIGHT: 170 LBS | SYSTOLIC BLOOD PRESSURE: 162 MMHG | DIASTOLIC BLOOD PRESSURE: 89 MMHG | HEART RATE: 91 BPM | HEIGHT: 70 IN | BODY MASS INDEX: 24.34 KG/M2

## 2022-09-15 DIAGNOSIS — M16.11 OSTEOARTHRITIS OF RIGHT HIP, UNSPECIFIED OSTEOARTHRITIS TYPE: Primary | ICD-10-CM

## 2022-09-15 DIAGNOSIS — M16.11 PRIMARY OSTEOARTHRITIS OF RIGHT HIP: Primary | ICD-10-CM

## 2022-09-15 PROBLEM — M16.9 HIP OSTEOARTHRITIS: Status: ACTIVE | Noted: 2022-09-15

## 2022-09-15 PROCEDURE — 99214 OFFICE O/P EST MOD 30 MIN: CPT | Performed by: INTERNAL MEDICINE

## 2022-09-15 PROCEDURE — 73502 X-RAY EXAM HIP UNI 2-3 VIEWS: CPT | Performed by: INTERNAL MEDICINE

## 2022-09-15 RX ORDER — POVIDONE-IODINE 10 MG/ML
SOLUTION TOPICAL ONCE
Status: CANCELLED | OUTPATIENT
Start: 2022-09-15 | End: 2022-09-15

## 2022-09-15 RX ORDER — CEFAZOLIN SODIUM 2 G/50ML
2 SOLUTION INTRAVENOUS ONCE
Status: CANCELLED | OUTPATIENT
Start: 2022-09-15 | End: 2022-09-15

## 2022-09-15 RX ORDER — CHLORHEXIDINE GLUCONATE 500 MG/1
CLOTH TOPICAL ONCE
Status: CANCELLED | OUTPATIENT
Start: 2022-09-15

## 2022-09-15 RX ORDER — PREGABALIN 75 MG/1
150 CAPSULE ORAL ONCE
Status: CANCELLED | OUTPATIENT
Start: 2022-09-15 | End: 2022-09-15

## 2022-09-15 NOTE — PROGRESS NOTES
"Subjective:     Patient ID: Arjun Plaza is a 82 y.o. male.    Chief Complaint:    History of Present Illness  Arjun Plaza returns to clinic today for evaluation of right hip pain.  Patient states that the pain began about 6 to 8 months ago.  Pain is increased this time is gone on and is now beginning to affect his activities of daily living.  He has difficulty with shoes and socks, getting on and off his riding mower, and getting up from a low seat.  He has tried ibuprofen 4 times a day as well as an intra-articular hip injection.  He thinks that the intra-articular hip injection helped him just for few hours.  The patient is here today to discuss right total hip arthroplasty.     Social History     Occupational History   • Not on file   Tobacco Use   • Smoking status: Never Smoker   • Smokeless tobacco: Never Used   Vaping Use   • Vaping Use: Never used   Substance and Sexual Activity   • Alcohol use: No   • Drug use: Yes   • Sexual activity: Yes     Partners: Female      Past Medical History:   Diagnosis Date   • Arthritis    • Cancer (HCC) basal cell   • GERD (gastroesophageal reflux disease)    • HL (hearing loss)    • Hyperlipidemia    • Hypertension      Past Surgical History:   Procedure Laterality Date   • COLONOSCOPY  2014   • FOREARM SURGERY     • HAND SURGERY Right    • VASECTOMY         Family History   Problem Relation Age of Onset   • Hypertension Father    • Diabetes Father    • Diabetes Sister    • Colon cancer Neg Hx    • Colon polyps Neg Hx                  Objective:  Vitals:    09/15/22 1056   BP: 162/89   Pulse: 91   Weight: 77.1 kg (170 lb)   Height: 177.8 cm (70\")         09/15/22  1056   Weight: 77.1 kg (170 lb)     Body mass index is 24.39 kg/m².        Right Hip Exam     Tenderness   The patient is experiencing no tenderness.     Range of Motion   Flexion: 90   External rotation: 10   Internal rotation: 5     Muscle Strength   Flexion: 5/5     Other   Erythema: absent  Scars: " absent  Sensation: normal  Pulse: present    Comments:  Positive [facility]      Left Hip Exam     Tenderness   The patient is experiencing no tenderness.     Range of Motion   Flexion: 110   External rotation: 30   Internal rotation: 20     Muscle Strength   Flexion: 5/5     Other   Erythema: absent  Sensation: normal  Pulse: present               Imaging:  3 views of the right hip and pelvis were ordered and independently reviewed by myself.  X-rays demonstrate severe osteoarthritis of the right hip with near complete obliteration of the joint space, subchondral sclerosis, osteophyte formation.  No signs of fracture dislocation or subluxation.  Joint space of the left hip is well-maintained.    Assessment:        1. Primary osteoarthritis of right hip           Plan:          He has failed conservative management and is interested in proceeding with hip replacement surgery.  The goals, indications, risks, and complications of the procedure were discussed with the patient. Specifically, I discussed the risks of the procedure, including dislocation, fracture, implant loosening, venous thromboembolism, limb length discrepancy, infection, nerve palsy, vascular injury, need for more procedures and the risks of anesthesia. I also explained that he would meet with Anesthesiology preoperatively to discuss anesthetic risk.  All questions were answered.   1. Plan for right total hip arthroplasty, anterior approach.  2. Follow up: 2 weeks post op      Arjun Plaza was in agreement with plan and had all questions answered.     Medications:  No orders of the defined types were placed in this encounter.      Followup:  No follow-ups on file.    Diagnoses and all orders for this visit:    1. Primary osteoarthritis of right hip (Primary)  -     XR Hip With or Without Pelvis 2 - 3 View Right          Dictated utilizing Dragon dictation

## 2022-10-13 ENCOUNTER — PRE-ADMISSION TESTING (OUTPATIENT)
Dept: PREADMISSION TESTING | Facility: HOSPITAL | Age: 82
End: 2022-10-13

## 2022-10-13 VITALS
WEIGHT: 177.4 LBS | HEIGHT: 70 IN | OXYGEN SATURATION: 97 % | DIASTOLIC BLOOD PRESSURE: 74 MMHG | RESPIRATION RATE: 16 BRPM | BODY MASS INDEX: 25.4 KG/M2 | HEART RATE: 83 BPM | SYSTOLIC BLOOD PRESSURE: 157 MMHG

## 2022-10-13 DIAGNOSIS — I10 PRIMARY HYPERTENSION: Primary | ICD-10-CM

## 2022-10-13 DIAGNOSIS — M16.11 OSTEOARTHRITIS OF RIGHT HIP, UNSPECIFIED OSTEOARTHRITIS TYPE: ICD-10-CM

## 2022-10-13 LAB
ABO GROUP BLD: NORMAL
ABO GROUP BLD: NORMAL
ANION GAP SERPL CALCULATED.3IONS-SCNC: 11.2 MMOL/L (ref 5–15)
BLD GP AB SCN SERPL QL: NEGATIVE
BUN SERPL-MCNC: 25 MG/DL (ref 8–23)
BUN/CREAT SERPL: 24.5 (ref 7–25)
CALCIUM SPEC-SCNC: 9.3 MG/DL (ref 8.6–10.5)
CHLORIDE SERPL-SCNC: 103 MMOL/L (ref 98–107)
CO2 SERPL-SCNC: 24.8 MMOL/L (ref 22–29)
CREAT SERPL-MCNC: 1.02 MG/DL (ref 0.76–1.27)
DEPRECATED RDW RBC AUTO: 42.4 FL (ref 37–54)
EGFRCR SERPLBLD CKD-EPI 2021: 73.8 ML/MIN/1.73
ERYTHROCYTE [DISTWIDTH] IN BLOOD BY AUTOMATED COUNT: 13 % (ref 12.3–15.4)
GLUCOSE SERPL-MCNC: 122 MG/DL (ref 65–99)
HBA1C MFR BLD: 5.9 % (ref 4.8–5.6)
HCT VFR BLD AUTO: 44.9 % (ref 37.5–51)
HGB BLD-MCNC: 14.5 G/DL (ref 13–17.7)
MCH RBC QN AUTO: 28.8 PG (ref 26.6–33)
MCHC RBC AUTO-ENTMCNC: 32.3 G/DL (ref 31.5–35.7)
MCV RBC AUTO: 89.3 FL (ref 79–97)
PLATELET # BLD AUTO: 236 10*3/MM3 (ref 140–450)
PMV BLD AUTO: 9.4 FL (ref 6–12)
POTASSIUM SERPL-SCNC: 3.9 MMOL/L (ref 3.5–5.2)
QT INTERVAL: 381 MS
RBC # BLD AUTO: 5.03 10*6/MM3 (ref 4.14–5.8)
RH BLD: NEGATIVE
RH BLD: NEGATIVE
SODIUM SERPL-SCNC: 139 MMOL/L (ref 136–145)
T&S EXPIRATION DATE: NORMAL
WBC NRBC COR # BLD: 6.37 10*3/MM3 (ref 3.4–10.8)

## 2022-10-13 PROCEDURE — 80048 BASIC METABOLIC PNL TOTAL CA: CPT | Performed by: INTERNAL MEDICINE

## 2022-10-13 PROCEDURE — 86901 BLOOD TYPING SEROLOGIC RH(D): CPT | Performed by: INTERNAL MEDICINE

## 2022-10-13 PROCEDURE — 36415 COLL VENOUS BLD VENIPUNCTURE: CPT

## 2022-10-13 PROCEDURE — 86901 BLOOD TYPING SEROLOGIC RH(D): CPT

## 2022-10-13 PROCEDURE — 86850 RBC ANTIBODY SCREEN: CPT | Performed by: INTERNAL MEDICINE

## 2022-10-13 PROCEDURE — 93010 ELECTROCARDIOGRAM REPORT: CPT | Performed by: INTERNAL MEDICINE

## 2022-10-13 PROCEDURE — 86900 BLOOD TYPING SEROLOGIC ABO: CPT | Performed by: INTERNAL MEDICINE

## 2022-10-13 PROCEDURE — 83036 HEMOGLOBIN GLYCOSYLATED A1C: CPT | Performed by: INTERNAL MEDICINE

## 2022-10-13 PROCEDURE — 85027 COMPLETE CBC AUTOMATED: CPT | Performed by: INTERNAL MEDICINE

## 2022-10-13 PROCEDURE — 93005 ELECTROCARDIOGRAM TRACING: CPT

## 2022-10-13 PROCEDURE — 86900 BLOOD TYPING SEROLOGIC ABO: CPT

## 2022-10-13 NOTE — PAT
Pt and spouse here for PAT/joint camp visit.  Pre-op tests completed, chg soap given, and instructions reviewed.  Instructed clears until 2 hrs prior to arrival time, voiced understanding. Pt denies issues w/metals/jewelry/etc causing skin reactions.

## 2022-10-13 NOTE — DISCHARGE INSTRUCTIONS
PRE-ADMISSION TESTING INSTRUCTIONS FOR TOTAL JOINT PATIENTS    Take these medications the morning of surgery with a small sip of water:  OMEPRAZOLE      No aspirin, advil, aleve, ibuprofen, naproxen, diet pills, decongestants, or vitamin/herbal supplements for a week prior to your surgery.  HOLD VITAMINS 7 DAYS PRIOR TO SURGERY    Tylenol/Acetaminophen ok to take if needed.    Do not take any insulin or diabetes medications the morning of surgery.      Start your Bactroban ointment on __FRIDAY_10/21/2022________.  You will need to apply the ointment with a clean Q-tip 3 times a day in both sides of your nose for 5 days and the morning of surgery.    General Instructions:    DO NOT EAT SOLID FOOD AFTER MIDNIGHT THE NIGHT BEFORE SURGERY. No gum, mints, or hard candy after midnight the night before surgery.  You may drink clear liquids the day of surgery up until 2 hours before your arrival time.  Clear liquids are liquids you can see through. Nothing RED in color.    Plain water    Sports drinks  Sodas     Gelatin (Jell-O)  Fruit juices without pulp such as white grape juice and apple juice  Popsicles that contain no fruit or yogurt  Tea or coffee (no cream or milk added)    It is beneficial for you to have a clear drink that contains carbohydrates just before you leave your house and before your fasting time begins.  We suggest a 20 ounce bottle of Gatorade or Powerade for non-diabetic patients or a 20 ounce bottle of G2 or Powerade Zero for diabetic patients.     Patients who avoid smoking, chewing tobacco and alcohol for 4 weeks prior to surgery have a reduced risk of post-operative complications.  If at all possible, quit smoking as many days before surgery as you can.    Do not smoke, use chewing tobacco or drink alcohol after midnight the day of surgery.    Bring your C-PAP/ BI-PAP machine if you use one.  Wear clean comfortable clothes and socks.  Do not wear contact lenses, lotion, deodorant, or make-up.   Bring a case for your glasses if applicable.   You may brush your teeth the morning of surgery.  You may wear dentures/partials, do not put adhesive/glue on them.  Leave all other jewelry and valuables at home.  NOTIFY YOUR SURGEON IF YOU BECOME ILL, HAVE A FEVER, PRODUCTIVE COUGH, OR CANNOT BE HERE THE DAY OF SURGERY.      Preventing a Surgical Site Infection:    Shower the night before and on the morning of surgery using the chlorhexidine soap you were given.  Use a clean washcloth with the soap.  Place clean sheets on your bed after showering the night before surgery. Do not use the CHG soap on your hair, face, or private areas. Wash your body gently for five (5) minutes. Do not scrub your skin.  Dry with a clean towel and dress in clean clothing.    Do not shave the surgical area for 10 days-2 weeks prior to surgery  because the razor can irritate skin and make it easier to develop an infection.  Make sure you, your family, and all healthcare providers clean their hands with soap and water or an alcohol based hand  before caring for you or your wound.      Day of surgery:    Your surgeon’s office will advise you of your arrival time for the day of surgery.    Upon arrival, a Pre-op nurse and Anesthesia provider will review your health history, obtain vital signs, and answer questions you may have.  The only belongings needed at this time will be your home medications and if applicable your C-PAP/BI-PAP machine.  If you are staying overnight your family can leave the rest of your belongings in the car and bring them to your room later.  A Pre-op nurse will start an IV and you may receive medication in preparation for surgery, including something to help you relax.  Your family will be able to see you in the Pre-op area.  While you are in surgery your family should notify the waiting room  if they leave the waiting room area and provide a contact phone number.    If you have any questions, you  can call the Pre-Admission Department at [phone] or your surgeon's office.    Please be aware that surgery does come with discomfort.  We want to make every effort to control your discomfort so please discuss any uncontrolled symptoms with your nurse.   Your doctor will most likely have prescribed pain medications.     You may have bruising or discomfort from the tourniquet used in surgery.     Please leave all luggage in the car the morning of surgery.  You will be transported to your hospital room following the recovery period.  Your family can get your luggage at that time.      You may receive a survey regarding the care you received. Your feedback is very important and will be used to collect the necessary data to help us to continue to provide excellent care.

## 2022-10-20 ENCOUNTER — OFFICE VISIT (OUTPATIENT)
Dept: ORTHOPEDIC SURGERY | Facility: CLINIC | Age: 82
End: 2022-10-20

## 2022-10-20 DIAGNOSIS — M16.11 PRIMARY OSTEOARTHRITIS OF RIGHT HIP: Primary | ICD-10-CM

## 2022-10-20 PROCEDURE — S0260 H&P FOR SURGERY: HCPCS | Performed by: INTERNAL MEDICINE

## 2022-10-20 NOTE — H&P
Subjective:     Patient ID: Arjun Plaza is a 82 y.o. male.    Chief Complaint:    History of Present Illness  Mr. Plaza is an 82-year-old male with a longstanding history of right hip pain and stiffness.  He has undergone conservative measures for his hip including activity modification, NSAIDs, and intra-articular hip injection.  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  He is here today for right total hip arthroplasty presurgical meeting      Additional Complaints:  Hip Pain  Patient complains of advanced hip osteoarthritis.  He has had problems with his right hip over the past 7 year and has been treated conservatively with over-the-counter anti-inflammatories and activity modification.  Pain has progressed significantly over  3 month to the point where the patient has 8 out of 10 hip pain with activity. Patient does have rest/ night time pain.       Social History     Occupational History   • Not on file   Tobacco Use   • Smoking status: Never   • Smokeless tobacco: Never   Vaping Use   • Vaping Use: Never used   Substance and Sexual Activity   • Alcohol use: No   • Drug use: Never   • Sexual activity: Yes     Partners: Female      Review of Systems   Musculoskeletal: Positive for arthralgias, gait problem and joint swelling.           Objective:     Right Hip Exam     Tenderness   The patient is experiencing tenderness in the anterior and lateral.    Range of Motion   Flexion: 90   External rotation: 20   Internal rotation: 0     Muscle Strength   Flexion: 4/5     Other   Erythema: absent  Scars: absent  Sensation: normal  Pulse: present    Comments:  Positive [facility]              Assessment:       1. Primary osteoarthritis of right hip          Plan:        He has failed conservative management and is interested in proceeding with hip replacement surgery.    The goals, indications, risks, and complications of the  procedure were discussed with the patient. Specifically, I discussed the risks of the procedure, including dislocation, fracture, implant loosening, venous thromboembolism, limb length discrepancy, infection, nerve palsy, vascular injury, need for more procedures and the risks of anesthesia. I also explained that he would meet with Anesthesiology preoperatively to discuss anesthetic risk.  All questions were answered.     Plan for right total hip arthroplasty, anterior approach.

## 2022-10-25 ENCOUNTER — ANESTHESIA EVENT (OUTPATIENT)
Dept: PERIOP | Facility: HOSPITAL | Age: 82
End: 2022-10-25

## 2022-10-26 ENCOUNTER — APPOINTMENT (OUTPATIENT)
Dept: GENERAL RADIOLOGY | Facility: HOSPITAL | Age: 82
End: 2022-10-26

## 2022-10-26 ENCOUNTER — ANESTHESIA (OUTPATIENT)
Dept: PERIOP | Facility: HOSPITAL | Age: 82
End: 2022-10-26

## 2022-10-26 ENCOUNTER — HOSPITAL ENCOUNTER (OUTPATIENT)
Facility: HOSPITAL | Age: 82
Discharge: HOME | End: 2022-10-26
Attending: INTERNAL MEDICINE | Admitting: INTERNAL MEDICINE

## 2022-10-26 VITALS
BODY MASS INDEX: 25.17 KG/M2 | SYSTOLIC BLOOD PRESSURE: 168 MMHG | WEIGHT: 175.4 LBS | DIASTOLIC BLOOD PRESSURE: 81 MMHG | OXYGEN SATURATION: 94 % | HEART RATE: 91 BPM | RESPIRATION RATE: 12 BRPM | TEMPERATURE: 97.5 F

## 2022-10-26 DIAGNOSIS — M16.11 PRIMARY OSTEOARTHRITIS OF RIGHT HIP: Primary | ICD-10-CM

## 2022-10-26 DIAGNOSIS — M16.11 OSTEOARTHRITIS OF RIGHT HIP, UNSPECIFIED OSTEOARTHRITIS TYPE: ICD-10-CM

## 2022-10-26 PROCEDURE — 25010000002 PHENYLEPHRINE 10 MG/ML SOLUTION: Performed by: NURSE ANESTHETIST, CERTIFIED REGISTERED

## 2022-10-26 PROCEDURE — 27130 TOTAL HIP ARTHROPLASTY: CPT | Performed by: INTERNAL MEDICINE

## 2022-10-26 PROCEDURE — A9270 NON-COVERED ITEM OR SERVICE: HCPCS | Performed by: INTERNAL MEDICINE

## 2022-10-26 PROCEDURE — 25010000002 ONDANSETRON PER 1 MG: Performed by: NURSE ANESTHETIST, CERTIFIED REGISTERED

## 2022-10-26 PROCEDURE — S0260 H&P FOR SURGERY: HCPCS | Performed by: INTERNAL MEDICINE

## 2022-10-26 PROCEDURE — 63710000001 POVIDONE-IODINE 10 % SOLUTION: Performed by: INTERNAL MEDICINE

## 2022-10-26 PROCEDURE — 25010000002 DEXAMETHASONE PER 1 MG: Performed by: NURSE ANESTHETIST, CERTIFIED REGISTERED

## 2022-10-26 PROCEDURE — 25010000002 EPINEPHRINE 1 MG/ML SOLUTION 1 ML VIAL: Performed by: INTERNAL MEDICINE

## 2022-10-26 PROCEDURE — 25010000002 PROPOFOL 200 MG/20ML EMULSION: Performed by: NURSE ANESTHETIST, CERTIFIED REGISTERED

## 2022-10-26 PROCEDURE — 25010000002 KETOROLAC TROMETHAMINE PER 15 MG: Performed by: INTERNAL MEDICINE

## 2022-10-26 PROCEDURE — 27130 TOTAL HIP ARTHROPLASTY: CPT | Performed by: SPECIALIST/TECHNOLOGIST, OTHER

## 2022-10-26 PROCEDURE — 97161 PT EVAL LOW COMPLEX 20 MIN: CPT

## 2022-10-26 PROCEDURE — 25010000002 VANCOMYCIN HCL 1.25 G RECONSTITUTED SOLUTION 1 EACH VIAL: Performed by: INTERNAL MEDICINE

## 2022-10-26 PROCEDURE — C1776 JOINT DEVICE (IMPLANTABLE): HCPCS | Performed by: INTERNAL MEDICINE

## 2022-10-26 PROCEDURE — 72170 X-RAY EXAM OF PELVIS: CPT

## 2022-10-26 PROCEDURE — 76000 FLUOROSCOPY <1 HR PHYS/QHP: CPT

## 2022-10-26 PROCEDURE — 73502 X-RAY EXAM HIP UNI 2-3 VIEWS: CPT

## 2022-10-26 PROCEDURE — 25010000002 ROPIVACAINE PER 1 MG: Performed by: INTERNAL MEDICINE

## 2022-10-26 PROCEDURE — 63710000001 PREGABALIN 75 MG CAPSULE: Performed by: INTERNAL MEDICINE

## 2022-10-26 DEVICE — PINNACLE POROCOAT ACETABULAR SHELL SECTOR II 56MM OD
Type: IMPLANTABLE DEVICE | Site: HIP | Status: FUNCTIONAL
Brand: PINNACLE POROCOAT

## 2022-10-26 DEVICE — PINNACLE HIP SOLUTIONS ALTRX POLYETHYLENE ACETABULAR LINER NEUTRAL 36MM ID 56MM OD
Type: IMPLANTABLE DEVICE | Site: HIP | Status: FUNCTIONAL
Brand: PINNACLE ALTRX

## 2022-10-26 DEVICE — DEV WND/CLS CONTRL TISS STRATAFIX SYMM PDS PLS CTX 60CM VIL: Type: IMPLANTABLE DEVICE | Site: HIP | Status: FUNCTIONAL

## 2022-10-26 DEVICE — BIOLOX DELTA CERAMIC FEMORAL HEAD +1.5 36MM DIA 12/14 TAPER
Type: IMPLANTABLE DEVICE | Site: HIP | Status: FUNCTIONAL
Brand: BIOLOX DELTA

## 2022-10-26 DEVICE — ACTIS DUOFIX HIP PROSTHESIS (FEMORAL STEM 12/14 TAPER CEMENTLESS SIZE 8 STD COLLAR)  CE
Type: IMPLANTABLE DEVICE | Site: HIP | Status: FUNCTIONAL
Brand: ACTIS

## 2022-10-26 DEVICE — TOTL HIP COP DEPUY 9641334: Type: IMPLANTABLE DEVICE | Site: HIP | Status: FUNCTIONAL

## 2022-10-26 DEVICE — DEV CONTRL TISS STRATAFIX SPIRAL MNCRYL UD 3/0 PLS 45CM: Type: IMPLANTABLE DEVICE | Site: HIP | Status: FUNCTIONAL

## 2022-10-26 RX ORDER — AMOXICILLIN 250 MG
2 CAPSULE ORAL DAILY
Qty: 60 TABLET | Refills: 0 | Status: SHIPPED | OUTPATIENT
Start: 2022-10-26 | End: 2022-11-10

## 2022-10-26 RX ORDER — SODIUM CHLORIDE 9 MG/ML
40 INJECTION, SOLUTION INTRAVENOUS AS NEEDED
Status: DISCONTINUED | OUTPATIENT
Start: 2022-10-26 | End: 2022-10-26 | Stop reason: HOSPADM

## 2022-10-26 RX ORDER — PROPOFOL 10 MG/ML
INJECTION, EMULSION INTRAVENOUS CONTINUOUS PRN
Status: DISCONTINUED | OUTPATIENT
Start: 2022-10-26 | End: 2022-10-26 | Stop reason: SURG

## 2022-10-26 RX ORDER — MELOXICAM 15 MG/1
15 TABLET ORAL DAILY
Qty: 30 TABLET | Refills: 0 | Status: SHIPPED | OUTPATIENT
Start: 2022-10-26

## 2022-10-26 RX ORDER — CHLORHEXIDINE GLUCONATE 500 MG/1
CLOTH TOPICAL ONCE
Status: DISCONTINUED | OUTPATIENT
Start: 2022-10-26 | End: 2022-10-26 | Stop reason: HOSPADM

## 2022-10-26 RX ORDER — ASPIRIN 81 MG/1
81 TABLET ORAL 2 TIMES DAILY
Status: CANCELLED | OUTPATIENT
Start: 2022-10-27

## 2022-10-26 RX ORDER — OXYCODONE HYDROCHLORIDE AND ACETAMINOPHEN 5; 325 MG/1; MG/1
1 TABLET ORAL ONCE AS NEEDED
Status: CANCELLED | OUTPATIENT
Start: 2022-10-26

## 2022-10-26 RX ORDER — ONDANSETRON 2 MG/ML
4 INJECTION INTRAMUSCULAR; INTRAVENOUS ONCE AS NEEDED
Status: CANCELLED | OUTPATIENT
Start: 2022-10-26

## 2022-10-26 RX ORDER — OXYCODONE HYDROCHLORIDE AND ACETAMINOPHEN 5; 325 MG/1; MG/1
1 TABLET ORAL EVERY 6 HOURS PRN
Qty: 45 TABLET | Refills: 0 | Status: SHIPPED | OUTPATIENT
Start: 2022-10-26 | End: 2022-10-26 | Stop reason: SDUPTHER

## 2022-10-26 RX ORDER — CEFAZOLIN SODIUM 2 G/50ML
2 SOLUTION INTRAVENOUS EVERY 8 HOURS
Status: CANCELLED | OUTPATIENT
Start: 2022-10-26 | End: 2022-10-26

## 2022-10-26 RX ORDER — SODIUM CHLORIDE, SODIUM LACTATE, POTASSIUM CHLORIDE, CALCIUM CHLORIDE 600; 310; 30; 20 MG/100ML; MG/100ML; MG/100ML; MG/100ML
100 INJECTION, SOLUTION INTRAVENOUS CONTINUOUS
Status: CANCELLED | OUTPATIENT
Start: 2022-10-26

## 2022-10-26 RX ORDER — GLYCOPYRROLATE 0.2 MG/ML
INJECTION INTRAMUSCULAR; INTRAVENOUS AS NEEDED
Status: DISCONTINUED | OUTPATIENT
Start: 2022-10-26 | End: 2022-10-26 | Stop reason: SURG

## 2022-10-26 RX ORDER — TRANEXAMIC ACID 10 MG/ML
1000 INJECTION, SOLUTION INTRAVENOUS ONCE
Status: DISCONTINUED | OUTPATIENT
Start: 2022-10-26 | End: 2022-10-26 | Stop reason: HOSPADM

## 2022-10-26 RX ORDER — PREGABALIN 75 MG/1
150 CAPSULE ORAL ONCE
Status: COMPLETED | OUTPATIENT
Start: 2022-10-26 | End: 2022-10-26

## 2022-10-26 RX ORDER — MIDAZOLAM HYDROCHLORIDE 2 MG/2ML
0.5 INJECTION, SOLUTION INTRAMUSCULAR; INTRAVENOUS
Status: DISCONTINUED | OUTPATIENT
Start: 2022-10-26 | End: 2022-10-26 | Stop reason: HOSPADM

## 2022-10-26 RX ORDER — POVIDONE-IODINE 10 MG/ML
SOLUTION TOPICAL ONCE
Status: DISCONTINUED | OUTPATIENT
Start: 2022-10-26 | End: 2022-10-26 | Stop reason: HOSPADM

## 2022-10-26 RX ORDER — KETAMINE HYDROCHLORIDE 10 MG/ML
INJECTION INTRAMUSCULAR; INTRAVENOUS AS NEEDED
Status: DISCONTINUED | OUTPATIENT
Start: 2022-10-26 | End: 2022-10-26 | Stop reason: SURG

## 2022-10-26 RX ORDER — MAGNESIUM HYDROXIDE 1200 MG/15ML
LIQUID ORAL AS NEEDED
Status: DISCONTINUED | OUTPATIENT
Start: 2022-10-26 | End: 2022-10-26 | Stop reason: HOSPADM

## 2022-10-26 RX ORDER — SODIUM CHLORIDE 0.9 % (FLUSH) 0.9 %
10 SYRINGE (ML) INJECTION AS NEEDED
Status: DISCONTINUED | OUTPATIENT
Start: 2022-10-26 | End: 2022-10-26 | Stop reason: HOSPADM

## 2022-10-26 RX ORDER — FENTANYL CITRATE 50 UG/ML
25 INJECTION, SOLUTION INTRAMUSCULAR; INTRAVENOUS
Status: CANCELLED | OUTPATIENT
Start: 2022-10-26

## 2022-10-26 RX ORDER — SODIUM CHLORIDE, SODIUM LACTATE, POTASSIUM CHLORIDE, CALCIUM CHLORIDE 600; 310; 30; 20 MG/100ML; MG/100ML; MG/100ML; MG/100ML
9 INJECTION, SOLUTION INTRAVENOUS CONTINUOUS PRN
Status: DISCONTINUED | OUTPATIENT
Start: 2022-10-26 | End: 2022-10-26 | Stop reason: HOSPADM

## 2022-10-26 RX ORDER — OXYCODONE HYDROCHLORIDE AND ACETAMINOPHEN 5; 325 MG/1; MG/1
1 TABLET ORAL EVERY 6 HOURS PRN
Qty: 45 TABLET | Refills: 0 | Status: SHIPPED | OUTPATIENT
Start: 2022-10-26 | End: 2022-11-22

## 2022-10-26 RX ORDER — TRANEXAMIC ACID 10 MG/ML
1000 INJECTION, SOLUTION INTRAVENOUS ONCE
Status: COMPLETED | OUTPATIENT
Start: 2022-10-26 | End: 2022-10-26

## 2022-10-26 RX ORDER — ONDANSETRON 2 MG/ML
4 INJECTION INTRAMUSCULAR; INTRAVENOUS ONCE AS NEEDED
Status: COMPLETED | OUTPATIENT
Start: 2022-10-26 | End: 2022-10-26

## 2022-10-26 RX ORDER — SODIUM CHLORIDE 0.9 % (FLUSH) 0.9 %
10 SYRINGE (ML) INJECTION EVERY 12 HOURS SCHEDULED
Status: DISCONTINUED | OUTPATIENT
Start: 2022-10-26 | End: 2022-10-26 | Stop reason: HOSPADM

## 2022-10-26 RX ORDER — DEXAMETHASONE SODIUM PHOSPHATE 4 MG/ML
8 INJECTION, SOLUTION INTRA-ARTICULAR; INTRALESIONAL; INTRAMUSCULAR; INTRAVENOUS; SOFT TISSUE ONCE AS NEEDED
Status: COMPLETED | OUTPATIENT
Start: 2022-10-26 | End: 2022-10-26

## 2022-10-26 RX ORDER — CEFAZOLIN SODIUM 2 G/50ML
2 SOLUTION INTRAVENOUS ONCE
Status: DISCONTINUED | OUTPATIENT
Start: 2022-10-26 | End: 2022-10-26 | Stop reason: HOSPADM

## 2022-10-26 RX ORDER — ASPIRIN 81 MG/1
81 TABLET ORAL 2 TIMES DAILY
Qty: 84 TABLET | Refills: 0 | Status: SHIPPED | OUTPATIENT
Start: 2022-10-26 | End: 2022-12-07

## 2022-10-26 RX ORDER — DIPHENHYDRAMINE HYDROCHLORIDE 50 MG/ML
12.5 INJECTION INTRAMUSCULAR; INTRAVENOUS
Status: CANCELLED | OUTPATIENT
Start: 2022-10-26

## 2022-10-26 RX ORDER — LIDOCAINE HYDROCHLORIDE 10 MG/ML
0.5 INJECTION, SOLUTION EPIDURAL; INFILTRATION; INTRACAUDAL; PERINEURAL ONCE AS NEEDED
Status: DISCONTINUED | OUTPATIENT
Start: 2022-10-26 | End: 2022-10-26 | Stop reason: HOSPADM

## 2022-10-26 RX ORDER — PHENYLEPHRINE HYDROCHLORIDE 10 MG/ML
INJECTION INTRAVENOUS AS NEEDED
Status: DISCONTINUED | OUTPATIENT
Start: 2022-10-26 | End: 2022-10-26 | Stop reason: SURG

## 2022-10-26 RX ORDER — SODIUM CHLORIDE 9 MG/ML
INJECTION, SOLUTION INTRAVENOUS AS NEEDED
Status: DISCONTINUED | OUTPATIENT
Start: 2022-10-26 | End: 2022-10-26 | Stop reason: HOSPADM

## 2022-10-26 RX ORDER — FAMOTIDINE 10 MG/ML
20 INJECTION, SOLUTION INTRAVENOUS
Status: COMPLETED | OUTPATIENT
Start: 2022-10-26 | End: 2022-10-26

## 2022-10-26 RX ORDER — BUPIVACAINE HYDROCHLORIDE 5 MG/ML
INJECTION, SOLUTION PERINEURAL
Status: COMPLETED | OUTPATIENT
Start: 2022-10-26 | End: 2022-10-26

## 2022-10-26 RX ORDER — EPHEDRINE SULFATE 50 MG/ML
INJECTION, SOLUTION INTRAVENOUS AS NEEDED
Status: DISCONTINUED | OUTPATIENT
Start: 2022-10-26 | End: 2022-10-26 | Stop reason: SURG

## 2022-10-26 RX ADMIN — DEXAMETHASONE SODIUM PHOSPHATE 8 MG: 4 INJECTION, SOLUTION INTRAMUSCULAR; INTRAVENOUS at 06:19

## 2022-10-26 RX ADMIN — PROPOFOL 50 MG: 10 INJECTION, EMULSION INTRAVENOUS at 08:20

## 2022-10-26 RX ADMIN — PHENYLEPHRINE HYDROCHLORIDE 100 MCG: 10 INJECTION INTRAVENOUS at 08:33

## 2022-10-26 RX ADMIN — VANCOMYCIN HYDROCHLORIDE 1250 MG: 1.25 INJECTION, POWDER, LYOPHILIZED, FOR SOLUTION INTRAVENOUS at 06:17

## 2022-10-26 RX ADMIN — PHENYLEPHRINE HYDROCHLORIDE 50 MCG: 10 INJECTION INTRAVENOUS at 08:43

## 2022-10-26 RX ADMIN — SODIUM CHLORIDE, POTASSIUM CHLORIDE, SODIUM LACTATE AND CALCIUM CHLORIDE 500 ML: 600; 310; 30; 20 INJECTION, SOLUTION INTRAVENOUS at 09:30

## 2022-10-26 RX ADMIN — GLYCOPYRROLATE 0.1 MG: 0.2 INJECTION INTRAMUSCULAR; INTRAVENOUS at 07:32

## 2022-10-26 RX ADMIN — EPHEDRINE SULFATE 10 MG: 50 INJECTION, SOLUTION INTRAVENOUS at 09:25

## 2022-10-26 RX ADMIN — KETAMINE HYDROCHLORIDE 10 MG: 10 INJECTION INTRAMUSCULAR; INTRAVENOUS at 07:32

## 2022-10-26 RX ADMIN — PHENYLEPHRINE HYDROCHLORIDE 100 MCG: 10 INJECTION INTRAVENOUS at 08:18

## 2022-10-26 RX ADMIN — PROPOFOL 30 MG: 10 INJECTION, EMULSION INTRAVENOUS at 08:00

## 2022-10-26 RX ADMIN — TRANEXAMIC ACID 1000 MG: 10 INJECTION, SOLUTION INTRAVENOUS at 07:52

## 2022-10-26 RX ADMIN — PHENYLEPHRINE HYDROCHLORIDE 100 MCG: 10 INJECTION INTRAVENOUS at 09:22

## 2022-10-26 RX ADMIN — BUPIVACAINE HYDROCHLORIDE 1.8 ML: 5 INJECTION, SOLUTION PERINEURAL at 07:20

## 2022-10-26 RX ADMIN — ONDANSETRON 4 MG: 2 INJECTION INTRAMUSCULAR; INTRAVENOUS at 06:19

## 2022-10-26 RX ADMIN — PROPOFOL 30 MCG/KG/MIN: 10 INJECTION, EMULSION INTRAVENOUS at 07:35

## 2022-10-26 RX ADMIN — FAMOTIDINE 20 MG: 10 INJECTION INTRAVENOUS at 06:19

## 2022-10-26 RX ADMIN — PHENYLEPHRINE HYDROCHLORIDE 100 MCG: 10 INJECTION INTRAVENOUS at 09:25

## 2022-10-26 RX ADMIN — EPHEDRINE SULFATE 15 MG: 50 INJECTION, SOLUTION INTRAVENOUS at 09:20

## 2022-10-26 RX ADMIN — KETAMINE HYDROCHLORIDE 20 MG: 10 INJECTION INTRAMUSCULAR; INTRAVENOUS at 07:50

## 2022-10-26 RX ADMIN — KETAMINE HYDROCHLORIDE 10 MG: 10 INJECTION INTRAMUSCULAR; INTRAVENOUS at 07:40

## 2022-10-26 RX ADMIN — TRANEXAMIC ACID 1000 MG: 10 INJECTION, SOLUTION INTRAVENOUS at 08:55

## 2022-10-26 RX ADMIN — PREGABALIN 150 MG: 75 CAPSULE ORAL at 06:24

## 2022-10-26 RX ADMIN — SODIUM CHLORIDE, POTASSIUM CHLORIDE, SODIUM LACTATE AND CALCIUM CHLORIDE 9 ML/HR: 600; 310; 30; 20 INJECTION, SOLUTION INTRAVENOUS at 06:17

## 2022-10-26 NOTE — ANESTHESIA POSTPROCEDURE EVALUATION
Patient: Arjun Plaza    Procedure Summary     Date: 10/26/22 Room / Location:  [facility] /  [facility]    Anesthesia Start: 0729 Anesthesia Stop: 0916    Procedure: TOTAL HIP ARTHROPLASTY ANTERIOR WITH HANA TABLE (Right: Hip) Diagnosis:       Osteoarthritis of right hip, unspecified osteoarthritis type      (Osteoarthritis of right hip, unspecified osteoarthritis type [M16.11])    Surgeons: Selvin Mendoza MD Provider: Yung Hogan CRNA    Anesthesia Type: MAC, spinal ASA Status: 2          Anesthesia Type: MAC, spinal    Vitals  Vitals Value Taken Time   /78 10/26/22 1050   Temp 97.5 °F (36.4 °C) 10/26/22 0945   Pulse 71 10/26/22 1054   Resp 12 10/26/22 1050   SpO2 94 % 10/26/22 1054   Vitals shown include unvalidated device data.        Post Anesthesia Care and Evaluation    Patient location during evaluation: bedside  Patient participation: complete - patient participated  Level of consciousness: awake and alert  Pain score: 0  Pain management: adequate    Airway patency: patent  Anesthetic complications: No anesthetic complications  PONV Status: none  Cardiovascular status: acceptable  Respiratory status: acceptable  Hydration status: acceptable  No anesthesia care post op

## 2022-10-26 NOTE — ANESTHESIA PREPROCEDURE EVALUATION
Anesthesia Evaluation     Patient summary reviewed and Nursing notes reviewed   no history of anesthetic complications:  NPO Solid Status: > 8 hours  NPO Liquid Status: > 4 hours           Airway   Mallampati: II  TM distance: >3 FB  Neck ROM: full  No difficulty expected  Dental - normal exam     Pulmonary - negative pulmonary ROS and normal exam   (-) recent URI, sleep apnea, not a smoker  Cardiovascular - normal exam  Exercise tolerance: good (4-7 METS)    ECG reviewed  Rhythm: regular  Rate: normal    (+) hypertension well controlled, hyperlipidemia,     ROS comment: HEART RATE= 81  bpm  RR Interval= 740  ms  LA Interval= 169  ms  P Horizontal Axis= -28  deg  P Front Axis= 59  deg  QRSD Interval= 93  ms  QT Interval= 381  ms  QRS Axis= 17  deg  T Wave Axis= 35  deg  - BORDERLINE ECG -  Sinus rhythm  Probable left atrial enlargement  NO PRIOR TRACING AVAILABLE FOR COMPARISON  Electronically Signed By: [facility] 13-Oct-2022 23:04:55  Date and Time of Study: 2022-10-13 09:25:07    Neuro/Psych- negative ROS  GI/Hepatic/Renal/Endo    (+)  GERD well controlled,      Musculoskeletal     Abdominal  - normal exam   Substance History - negative use     OB/GYN          Other   arthritis,    history of cancer (basal cell carcinoma) remission                    Anesthesia Plan    ASA 2     MAC and spinal   total IV anesthesia  intravenous induction     Anesthetic plan, risks, benefits, and alternatives have been provided, discussed and informed consent has been obtained with: patient.    Use of blood products discussed with patient  Consented to blood products.   Plan discussed with CRNA.        CODE STATUS:

## 2022-10-26 NOTE — ANESTHESIA PROCEDURE NOTES
Spinal Block      Patient reassessed immediately prior to procedure    Patient location during procedure: pre-op  Start Time: 10/26/2022 7:19 AM  Stop Time: 10/26/2022 7:20 AM  Indication:at surgeon's request and post-op pain management  Performed By  CRNA/MELANIA: Yung Hogan CRNASRNA: Eloina Blanco SRNA  Preanesthetic Checklist  Completed: patient identified, IV checked, site marked, risks and benefits discussed, surgical consent, monitors and equipment checked, pre-op evaluation and timeout performed  Spinal Block Prep:  Patient Position:sitting  Sterile Tech:cap, gloves, mask and sterile barriers  Prep:Chloraprep  Patient Monitoring:blood pressure monitoring, continuous pulse oximetry and EKG    Spinal Block Procedure  Approach:midline  Guidance:landmark technique and palpation technique  Location:L3-L4  Needle Type:Sprotte  Needle Gauge:25 G  Placement of Spinal needle event:cerebrospinal fluid aspirated  Paresthesia: no  Fluid Appearance:clear  Medications: bupivacaine (MARCAINE) injection 0.5% - Injection   1.8 mL - 10/26/2022 7:20:00 AM   Post Assessment  Patient Tolerance:patient tolerated the procedure well with no apparent complications  Complications no

## 2022-10-28 ENCOUNTER — TELEPHONE (OUTPATIENT)
Dept: ORTHOPEDIC SURGERY | Facility: CLINIC | Age: 82
End: 2022-10-28

## 2022-10-28 ENCOUNTER — HOME HEALTH ADMISSION (OUTPATIENT)
Dept: HOME HEALTH SERVICES | Facility: HOME HEALTHCARE | Age: 82
End: 2022-10-28

## 2022-10-28 DIAGNOSIS — Z96.641 HISTORY OF RIGHT HIP REPLACEMENT: Primary | ICD-10-CM

## 2022-10-28 NOTE — TELEPHONE ENCOUNTER
Wife called in again and stated that she wanted to do home health now.  I informed her that we had been trying to reach her several times and her phone isn't accepting calls nor could I leave a vm.  I informed her that JRL was in surgery; however, I could send him a message to put in a HH referral.  She said that she would talk to her  and see if that's what he really wants and would let us know for sure if he wants to do HH vs. Outpatient PT.

## 2022-10-28 NOTE — TELEPHONE ENCOUNTER
Patient called w/post op questions for her .  I relayed those to MINI and got the answers for her.  I tried to call her back on both numbers and it stated they weren't taking calls at this time and there was no voicemail.  If she calls back here are the answers to her questions.    1.  The PT is ok to start on 10.31 like she requested.  Referral has already been put in.    2.  He doesn't need a spirometer.  [facility] would have issued one from the hospital if patient needed it.    3.  It is perfectly ok if the patient is walking, it's actually encouraged.

## 2022-10-28 NOTE — TELEPHONE ENCOUNTER
Patients wife called back.  Pt. Doesn't think that he can do outpatient pt.  He now wants home health to come.  Can you put the referral in?  Thank you so much.

## 2022-10-31 ENCOUNTER — APPOINTMENT (OUTPATIENT)
Dept: PHYSICAL THERAPY | Facility: HOSPITAL | Age: 82
End: 2022-10-31

## 2022-10-31 ENCOUNTER — TELEPHONE (OUTPATIENT)
Dept: ORTHOPEDIC SURGERY | Facility: CLINIC | Age: 82
End: 2022-10-31

## 2022-10-31 NOTE — TELEPHONE ENCOUNTER
Hub staff attempted to follow warm transfer process and was unsuccessful     Caller: Catherine Plaza    Relationship to patient: Emergency Contact    Best call back number: [phone]   Patient is needing: THEY ARE CONCERNED WITH HIS HOME HEALTH ORDERS. WIFE STATES THAT CARMELA WAS TO CALL THEM BACK AND SHE HASN'T HEARD ANYTHING.

## 2022-10-31 NOTE — TELEPHONE ENCOUNTER
Spoke with patient's wife, she is very concerned that he has not started PT.  Told her referral has been put in and home health is working on it.  Notified Dr. Mendoza, he said as long as patient is up and walking he is not concerned at this time.

## 2022-11-02 ENCOUNTER — HOSPITAL ENCOUNTER (OUTPATIENT)
Dept: PHYSICAL THERAPY | Facility: HOSPITAL | Age: 82
Setting detail: THERAPIES SERIES
Discharge: HOME | End: 2022-11-02

## 2022-11-02 DIAGNOSIS — M16.11 PRIMARY OSTEOARTHRITIS OF RIGHT HIP: Primary | ICD-10-CM

## 2022-11-02 PROCEDURE — 97161 PT EVAL LOW COMPLEX 20 MIN: CPT | Performed by: PHYSICAL THERAPIST

## 2022-11-02 NOTE — THERAPY EVALUATION
Outpatient Physical Therapy Ortho Initial Evaluation   Reena Owen     Patient Name: Arjun Plaza  : 1940  MRN: [id number]  Today's Date: 2022      Visit Date: 2022    Patient Active Problem List   Diagnosis   • Pure hypercholesterolemia   • Essential hypertension   • Vitamin B12 deficiency   • Vitamin D deficiency   • Gastroesophageal reflux disease   • Anosmia   • Hip osteoarthritis        Past Medical History:   Diagnosis Date   • Arthritis     OA right hip, left shoulder   • Cancer (HCC) basal cell   • GERD (gastroesophageal reflux disease)    • Hip arthrosis 6 momths ago   • HL (hearing loss)    • Hyperlipidemia    • Hypertension         Past Surgical History:   Procedure Laterality Date   • COLONOSCOPY     • EYE SURGERY Left     eyelid place removed   • HAND SURGERY Right    • TOTAL HIP ARTHROPLASTY Right 10/26/2022    Procedure: TOTAL HIP ARTHROPLASTY ANTERIOR WITH HANA TABLE;  Surgeon: Selvin Mendoza MD;  Location: [facility];  Service: Orthopedics;  Laterality: Right;  RIGHT HIP ARTHROPLASTY ANTERIOR WITH HANA TABLE   • VASECTOMY         Visit Dx:     ICD-10-CM ICD-9-CM   1. Primary osteoarthritis of right hip  M16.11 715.15          Patient History     Row Name 22 1500 22 1655          History    Chief Complaint Difficulty Walking;Difficulty with daily activities;Muscle weakness  -SP --     Date Current Problem(s) Began 10/26/22  -SP --     Brief Description of Current Complaint Patient with chronic history of right hip pain previously treated with medication and injections.  Symptoms have continued to worsen over the last few month and failed to respond to conservative measures.  Patient underwent THR 10-26-22.  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  Patient did not use an assistive device prior to surgery.  He is intermittently using a rolling walker and SBQC.  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 ice, pain meds  -SP --     What clinical tests have you had for this problem? X-ray  -SP --        Pain     Pain Location Hip  -SP --     Pain at Present 2  -SP --     Pain at Best 0  -SP --     Pain at Worst 5  -SP --     Pain Frequency Intermittent  -SP --     Pain Description Aching;Tightness  -SP --     What Performance Factors Make the Current Problem(s) WORSE? increased weight bearing, transfers, at night  -SP --     What Performance Factors Make the Current Problem(s) BETTER? move out of uncomfortable position, movement  -SP --     Tolerance Time- Standing limited  -SP --     Tolerance Time- Sitting has to  shift weight frequently  -SP --     Tolerance Time- Walking limited  -SP --     Is your sleep disturbed? Yes  -SP --     What position do you sleep in? Other (comment)  sleeps in recliner  -SP --     Difficulties with ADL's? difficulty dressing lower body, increased time and difficulty with transfers, home and community mobility  -SP --        Fall Risk Assessment    Any falls in the past year: No  -SP --        Daily Activities    Primary Language English  -SP --     How does patient learn best? Listening;Reading;Demonstration;Pictures/Video  -SP --     Teaching needs identified Home Exercise Program;Management of Condition  -SP --     Does patient have problems with the following? None  -SP --     Barriers to learning None  -SP  --     Pt Participated in POC and Goals Yes  -SP --        Safety    Are you being hurt, hit, or frightened by anyone at home or in your life? No  -SP --     Are you being neglected by a caregiver No  -SP --           User Key  (r) = Recorded By, (t) = Taken By, (c) = Cosigned By    Initials Name Provider Type    Lita Riojas, PT Physical Therapist    patient Arjun Plaza --                 PT Ortho     Row Name 11/02/22 1500       Posture/Observations    Observations Edema;Incision healing;Muscle atrophy  -SP    Posture/Observations Comments Patient with moderate edema at anterior lateral right hip; incision with good closure and covered with steri strips.  Generalized atrophy of right glute and hip musculature.  -SP       Hip/Thigh Palpation    Greater Trochanter Right:;Tender;Guarded/taut  -SP    Anterior Capsule Right:;Tender;Guarded/taut;Swollen  -SP    Iliopsoas Right:;Tender;Guarded/taut  -SP    Gluteus Gopal Right:;Tender;Atrophied  -SP    Gluteus Medius Right:;Guarded/taut;Atrophied  -SP    Gluteus Minimus Right:;Tender;Atrophied  -SP    Piriformis Right:;Tender;Guarded/taut  -SP    Quadriceps Bilateral:;Tender  -SP    Biceps Formoris Bilateral:;Tender;Guarded/taut  -SP    Semimembranosis Bilateral:;Tender;Guarded/taut  -SP    Semitendinosis Bilateral:;Tender;Guarded/taut  -SP    SI Right:;Tender  -SP    ITB Right:;Guarded/taut;Tender  -SP       Knee Special Tests    Dwayne’s sign (DVT) Right:;Negative  -SP       Right Lower Ext    Rt Hip ABduction AROM 18 degrees  -SP    Rt Hip ADduction AROM 10 degrees  -SP    Rt Hip Extension AROM to nuetral  -SP    Rt Hip Flexion AROM 80 degrees (measured in supine)  -SP    Rt Knee Extension/Flexion AROM 0 to 127 degrees  -SP    Rt Ankle Dorsiflexion AROM 5 degrees  -SP    Rt Ankle Plantarflexion AROM WFL  -SP       MMT Right Lower Ext    Rt Hip Flexion MMT, Gross Movement (3/5) fair  -SP    Rt Hip Extension MMT, Gross Movement (3-/5) fair minus   -SP    Rt Hip ABduction MMT, Gross Movement (3-/5) fair minus  -SP    Rt Hip ADduction MMT, Gross Movement (3+/5) fair plus  -SP    Rt Knee Extension MMT, Gross Movement (3/5) fair  -SP    Rt Knee Flexion MMT, Gross Movement (4-/5) good minus  -SP    Rt Ankle Plantarflexion MMT, Gross Movement (4-/5) good minus  -SP    Rt Ankle Dorsiflexion MMT, Gross Movement (4-/5) good minus  -SP       MMT Left Lower Ext    Lt Hip Flexion MMT, Gross Movement (4+/5) good plus  -SP    Lt Hip Extension MMT, Gross Movement (4+/5) good plus  -SP    Lt Hip ABduction MMT, Gross Movement (4+/5) good plus  -SP    Lt Hip ADduction MMT, Gross Movement (4+/5) good plus  -SP    Lt Knee Extension MMT, Gross Movement (4+/5) good plus  -SP    Lt Knee Flexion MMT, Gross Movement (4+/5) good plus  -SP       Sensation    Sensation WNL? WFL  -SP       Lower Extremity Flexibility    Hamstrings Right:;Moderately limited  -SP    Hip Flexors Right:;Moderately limited  -SP    Hip External Rotators Right:;Moderately limited  -SP    Hip Internal Rotators Right:;Moderately limited  -SP       Transfers    Bed-Chair [place] (Transfers) independent  -SP    Chair-Bed [place] (Transfers) independent  -SP    Sit-Stand [place] (Transfers) independent  -SP    Stand-Sit [place] (Transfers) independent  -SP       Gait/Stairs (Locomotion)    [place] Level (Gait) independent  -SP    Assistive Device (Gait) cane, quad;walker, front-wheeled  -SP    Deviations/Abnormal Patterns (Gait) antalgic;gait speed decreased;stride length decreased  -SP    Bilateral Gait Deviations forward flexed posture  -SP    Ascending Technique (Stairs) step-to-step  -SP    Descending Technique (Stairs) step-to-step  -SP    Comment, (Gait/Stairs) Patient reports he is intermittently using his rolling walker and SBQC.  He requires use of bilateral rails for safety on stairs.  -SP          User Key  (r) = Recorded By, (t) = Taken By, (c) = Cosigned By    Initials Name  Provider Type    Lita Riojas, PT Physical Therapist                            Therapy Education  Given: HEP, Symptoms/condition management  Program: New  How Provided: Verbal, Written  Provided to: Patient  Level of Understanding: Verbalized      PT OP Goals     Row Name 11/02/22 1700          PT Short Term Goals    STG Date to Achieve 11/17/22  -SP     STG 1 Patient able to transfers supine to sit without increased time or difficulty  -SP     STG 2 Patient able to transfer sit to stand with decreased use of upper extremities to assist  -SP     STG 3 Patient ambulates level surfaces safely without assistive device  -SP        Long Term Goals    LTG Date to Achieve 12/02/22  -SP     LTG 1 Patient ambulates stairs with normal reciprocal gait pattern  -SP     LTG 2 Patient  demonstrates right LE strength increased by one muscle grade  -SP     LTG 3 Patient able to ambulate on unlevel surfaces and community distances safely and without increased symptoms  -SP     LTG 4 Patient independent with HEP  -SP        Time Calculation    PT Goal Re-Cert Due Date 12/02/22  -SP           User Key  (r) = Recorded By, (t) = Taken By, (c) = Cosigned By    Initials Name Provider Type    Lita Riojas, PT Physical Therapist                 PT Assessment/Plan     Row Name 11/02/22 1735          PT Assessment    Functional Limitations Impaired gait;Limitation in home management;Performance in self-care ADL;Performance in leisure activities;Limitations in functional capacity and performance;Limitations in community activities  -SP     Assessment Comments Patient with chronic history of right hip pain due to osteoarthritis, that failed to improve with conservative measures.  Patient presents with pain, decreased hip mobility, decreased strength, altered gait and transfers and difficulty with home and community ADLs  -SP     Please refer to paper survey for additional self-reported information Yes  -SP      Rehab Potential Good  -SP     Patient/caregiver participated in establishment of treatment plan and goals Yes  -SP     Patient would benefit from skilled therapy intervention Yes  -SP        PT Plan    PT Frequency 2x/week;1x/week  -SP     Predicted Duration of Therapy Intervention (PT) 4-6 weeks  -SP     Planned CPT's? PT EVAL LOW COMPLEXITY: 20754;PT THER PROC EA 15 MIN: 85809;PT MANUAL THERAPY EA 15 MIN: 73933;PT GAIT TRAINING EA 15 MIN: 00723;PT HOT OR COLD PACK TREAT [facility]ARE;PT ELECTRICAL STIM UNATTEND:   -SP           User Key  (r) = Recorded By, (t) = Taken By, (c) = Cosigned By    Initials Name Provider Type    Lita Riojas, PT Physical Therapist                   OP Exercises     Row Name 11/02/22 1700             Exercise 1    Exercise Name 1 glute set  -SP      Cueing 1 Verbal;Tactile  -SP      Reps 1 10  -SP      Time 1 5 sec  -SP         Exercise 2    Exercise Name 2 ankle pumps  -SP      Cueing 2 Verbal;Demo  -SP      Reps 2 20  -SP         Exercise 3    Exercise Name 3 SAQ  -SP      Cueing 3 Verbal;Tactile  -SP      Reps 3 20  -SP         Exercise 4    Exercise Name 4 supinehip abduction  -SP      Cueing 4 Verbal;Tactile  -SP      Reps 4 20  -SP         Exercise 5    Exercise Name 5 bent knee fallout  -SP      Cueing 5 Verbal;Tactile  -SP      Reps 5 20  -SP         Exercise 6    Exercise Name 6 seated hip abduction  -SP      Cueing 6 Verbal;Demo  -SP      Reps 6 20  -SP         Exercise 7    Exercise Name 7 LAQ  -SP      Cueing 7 Verbal;Tactile  -SP      Reps 7 20  -SP            User Key  (r) = Recorded By, (t) = Taken By, (c) = Cosigned By    Initials Name Provider Type    Lita Riojas, PT Physical Therapist                              Outcome Measure Options: Lower Extremity Functional Scale (LEFS)  Lower Extremity Functional Index  Any of your usual work, housework or school activities: Moderate difficulty  Your usual hobbies, recreational or sporting activities:  Moderate difficulty  Getting into or out of the bath: Moderate difficulty  Walking between rooms: Moderate difficulty  Putting on your shoes or socks: Moderate difficulty  Squatting: Moderate difficulty  Lifting an object, like a bag of groceries from the floor: No difficulty  Performing light activities around your home: No difficulty  Performing heavy activities around your home: Extreme difficulty or unable to perform activity  Getting into or out of a car: Moderate difficulty  Walking 2 blocks: Extreme difficulty or unable to perform activity  Walking a mile: Extreme difficulty or unable to perform activity  Going up or down 10 stairs (about 1 flight of stairs): Extreme difficulty or unable to perform activity  Standing for 1 hour: Extreme difficulty or unable to perform activity  Sitting for 1 hour: Extreme difficulty or unable to perform activity  Running on even ground: Extreme difficulty or unable to perform activity  Running on uneven ground: Extreme difficulty or unable to perform activity  Making sharp turns while running fast: Extreme difficulty or unable to perform activity  Hopping: Extreme difficulty or unable to perform activity  Rolling over in bed: Extreme difficulty or unable to perform activity  Total: 22      Time Calculation:     Start Time: 1030  Stop Time: 1130  Time Calculation (min): 60 min  Untimed Charges  PT Eval/Re-eval Minutes: 60  Total Minutes  Untimed Charges Total Minutes: 60   Total Minutes: 60     Therapy Charges for Today     Code Description Service Date Service Provider Modifiers Qty    30517338476 HC PT EVAL LOW COMPLEXITY 4 11/2/2022 Lita Mckeon, PT GP 1          PT G[facility]  Outcome Measure Options: Lower Extremity Functional Scale (LEFS)  Total: 22         Lita Mckeon, PT  11/2/2022

## 2022-11-07 ENCOUNTER — HOSPITAL ENCOUNTER (OUTPATIENT)
Dept: PHYSICAL THERAPY | Facility: HOSPITAL | Age: 82
Setting detail: THERAPIES SERIES
Discharge: HOME | End: 2022-11-07

## 2022-11-07 DIAGNOSIS — M16.11 PRIMARY OSTEOARTHRITIS OF RIGHT HIP: Primary | ICD-10-CM

## 2022-11-07 PROCEDURE — 97110 THERAPEUTIC EXERCISES: CPT | Performed by: PHYSICAL THERAPIST

## 2022-11-07 NOTE — THERAPY TREATMENT NOTE
Outpatient Physical Therapy Ortho Treatment Note   Reena Owen     Patient Name: Arjun Plaza  : 1940  MRN: [id number]  Today's Date: 2022      Visit Date: 2022    Visit Dx:    ICD-10-CM ICD-9-CM   1. Primary osteoarthritis of right hip  M16.11 715.15       Patient Active Problem List   Diagnosis   • Pure hypercholesterolemia   • Essential hypertension   • Vitamin B12 deficiency   • Vitamin D deficiency   • Gastroesophageal reflux disease   • Anosmia   • Hip osteoarthritis        Past Medical History:   Diagnosis Date   • Arthritis     OA right hip, left shoulder   • Cancer (HCC) basal cell   • GERD (gastroesophageal reflux disease)    • Hip arthrosis 6 momths ago   • HL (hearing loss)    • Hyperlipidemia    • Hypertension         Past Surgical History:   Procedure Laterality Date   • COLONOSCOPY     • EYE SURGERY Left     eyelid place removed   • HAND SURGERY Right    • TOTAL HIP ARTHROPLASTY Right 10/26/2022    Procedure: TOTAL HIP ARTHROPLASTY ANTERIOR WITH HANA TABLE;  Surgeon: Selvin Mendoza MD;  Location: [facility];  Service: Orthopedics;  Laterality: Right;  RIGHT HIP ARTHROPLASTY ANTERIOR WITH HANA TABLE   • VASECTOMY          PT Ortho     Row Name 22 1100       Subjective Comments    Subjective Comments Patient reports that he did not have too much soreness following last visit.  He reports that he has been doing exercises at home 2x/day  -SP          User Key  (r) = Recorded By, (t) = Taken By, (c) = Cosigned By    Initials Name Provider Type    SP Lita Mckeon, PT Physical Therapist                             PT Assessment/Plan     Row Name 22 1208          PT Assessment    Assessment Comments Patient demonstrates good compliance with HEP.  Patient tolerates progression of ther-ex well  -SP        PT Plan    PT Plan Comments Continue to progress as tolerable  -SP           User Key  (r) = Recorded By, (t) = Taken By, (c) = Cosigned By     "Initials Name Provider Type    SP Lita Mckeon, PT Physical Therapist                   OP Exercises     Row Name 11/07/22 1209 11/07/22 1100          Subjective Comments    Subjective Comments -- Patient reports that he did not have too much soreness following last visit.  He reports that he has been doing exercises at home 2x/day  -SP        Total Minutes    53935 - PT Therapeutic Exercise Minutes 30  -SP --        Exercise 1    Exercise Name 1 -- glute set  -SP     Cueing 1 -- Verbal;Tactile  -SP     Reps 1 -- 15  -SP     Time 1 -- 5 sec  -SP        Exercise 2    Exercise Name 2 -- ankle pumps  -SP     Cueing 2 -- Verbal;Demo  -SP     Reps 2 -- 30  -SP        Exercise 3    Exercise Name 3 -- SAQ  -SP     Cueing 3 -- Verbal;Tactile  -SP     Reps 3 -- 30  -SP     Time 3 -- 2#  -SP        Exercise 4    Exercise Name 4 -- QS  -SP     Cueing 4 -- Verbal;Tactile  -SP     Reps 4 -- 15  -SP     Time 4 -- 5 sec  -SP        Exercise 5    Exercise Name 5 -- clams  -SP     Cueing 5 -- Verbal;Tactile  -SP     Reps 5 -- 20  -SP     Time 5 -- 3 sec  -SP        Exercise 6    Exercise Name 6 -- hip adduction ball squeeze  -SP     Cueing 6 -- Verbal;Tactile  -SP     Reps 6 -- 20  -SP     Time 6 -- 3 sec  -SP        Exercise 7    Exercise Name 7 -- heel slide  -SP     Cueing 7 -- Verbal;Tactile  -SP     Time 7 -- 5 min  -SP        Exercise 8    Exercise Name 8 -- LAQ  -SP     Cueing 8 -- Verbal;Demo  -SP     Reps 8 -- 25  -SP        Exercise 9    Exercise Name 9 -- hip abduction in sitting  -SP     Cueing 9 -- Verbal;Demo  -SP     Reps 9 -- 25  -SP        Exercise 10    Exercise Name 10 -- sit to stand from elevated surface ([facility])  -SP     Cueing 10 -- Verbal;Demo  -SP     Reps 10 -- 20  -SP        Exercise 11    Exercise Name 11 -- heel raises  -SP     Cueing 11 -- Verbal;Demo  -SP     Reps 11 -- 20  -SP        Exercise 12    Exercise Name 12 -- step ups 6\"  -SP     Cueing 12 -- Verbal;Demo  -SP     Reps 12 -- 15 x " each  -SP           User Key  (r) = Recorded By, (t) = Taken By, (c) = Cosigned By    Initials Name Provider Type    SP Lita Mckeon, PT Physical Therapist                                 Therapy Education  Given: HEP  Program: Progressed  How Provided: Verbal, Written  Provided to: Patient  Level of Understanding: Verbalized, Demonstrated              Time Calculation:   Start Time: 1045  Stop Time: 1150  Time Calculation (min): 65 min  Timed Charges  38566 - PT Therapeutic Exercise Minutes: 30  Total Minutes  Timed Charges Total Minutes: 30   Total Minutes: 30  Therapy Charges for Today     Code Description Service Date Service Provider Modifiers Qty    [id number]  PT THER PROC EA 15 MIN 11/7/2022 Lita Mckeon, PT GP 2                    Lita Mckeon, PT  11/7/2022

## 2022-11-09 ENCOUNTER — HOSPITAL ENCOUNTER (OUTPATIENT)
Dept: PHYSICAL THERAPY | Facility: HOSPITAL | Age: 82
Setting detail: THERAPIES SERIES
Discharge: HOME | End: 2022-11-09

## 2022-11-09 DIAGNOSIS — M16.11 PRIMARY OSTEOARTHRITIS OF RIGHT HIP: Primary | ICD-10-CM

## 2022-11-09 PROCEDURE — 97110 THERAPEUTIC EXERCISES: CPT | Performed by: PHYSICAL THERAPIST

## 2022-11-09 NOTE — THERAPY TREATMENT NOTE
Outpatient Physical Therapy Ortho Treatment Note   Reena Owen     Patient Name: Arjun Plaza  : 1940  MRN: [id number]  Today's Date: 2022      Visit Date: 2022    Visit Dx:    ICD-10-CM ICD-9-CM   1. Primary osteoarthritis of right hip  M16.11 715.15       Patient Active Problem List   Diagnosis   • Pure hypercholesterolemia   • Essential hypertension   • Vitamin B12 deficiency   • Vitamin D deficiency   • Gastroesophageal reflux disease   • Anosmia   • Hip osteoarthritis        Past Medical History:   Diagnosis Date   • Arthritis     OA right hip, left shoulder   • Cancer (HCC) basal cell   • GERD (gastroesophageal reflux disease)    • Hip arthrosis 6 momths ago   • HL (hearing loss)    • Hyperlipidemia    • Hypertension         Past Surgical History:   Procedure Laterality Date   • COLONOSCOPY     • EYE SURGERY Left     eyelid place removed   • HAND SURGERY Right    • TOTAL HIP ARTHROPLASTY Right 10/26/2022    Procedure: TOTAL HIP ARTHROPLASTY ANTERIOR WITH HANA TABLE;  Surgeon: Selvin Mendoza MD;  Location: [facility];  Service: Orthopedics;  Laterality: Right;  RIGHT HIP ARTHROPLASTY ANTERIOR WITH HANA TABLE   • VASECTOMY          PT Ortho     Row Name 22 1100       Subjective Comments    Subjective Comments Patient reports that he is doing well and has not been getting sore following exercises.  -SP       Posture/Observations    Posture/Observations Comments patient continues to intermittently use his cane but walks room distances in clinic without assistive device.  -SP          User Key  (r) = Recorded By, (t) = Taken By, (c) = Cosigned By    Initials Name Provider Type    Lita Riojas, PT Physical Therapist                             PT Assessment/Plan     Row Name 22 1532          PT Assessment    Assessment Comments Patient progressing well.  He is able to tolerate exercise progression with minimal difficulty  -SP        PT Plan    PT Plan  Comments Continue to progress as tolerable  -SP           User Key  (r) = Recorded By, (t) = Taken By, (c) = Cosigned By    Initials Name Provider Type    Lita Riojas PT Physical Therapist                   OP Exercises     Row Name 11/09/22 1533 11/09/22 1100          Subjective Comments    Subjective Comments -- Patient reports that he is doing well and has not been getting sore following exercises.  -SP        Total Minutes    95920 - PT Therapeutic Exercise Minutes 15  -SP --        Exercise 1    Exercise Name 1 -- glute set  -SP     Cueing 1 -- Verbal;Tactile  -SP     Reps 1 -- 15  -SP     Time 1 -- 5 sec  -SP        Exercise 2    Exercise Name 2 -- sidelying clams  -SP     Cueing 2 -- Verbal;Tactile  -SP     Reps 2 -- 15  -SP        Exercise 3    Exercise Name 3 -- SAQ  -SP     Cueing 3 -- Verbal;Tactile  -SP     Reps 3 -- 30  -SP     Time 3 -- 2#  -SP        Exercise 4    Exercise Name 4 -- sidelying hip abduction  -SP     Cueing 4 -- Verbal;Tactile  -SP     Reps 4 -- 15  -SP        Exercise 5    Exercise Name 5 -- supine clams  -SP     Cueing 5 -- Verbal;Tactile  -SP     Reps 5 -- 20  -SP     Time 5 -- 3 sec  -SP     Additional Comments -- blue tband  -SP        Exercise 6    Exercise Name 6 -- hip adduction ball squeeze  -SP     Cueing 6 -- Verbal;Tactile  -SP     Reps 6 -- 20  -SP     Time 6 -- 3 sec  -SP        Exercise 7    Exercise Name 7 -- heel slide  -SP     Cueing 7 -- Verbal;Tactile  -SP     Time 7 -- 5 min  -SP        Exercise 8    Exercise Name 8 -- LAQ  -SP     Cueing 8 -- Verbal;Demo  -SP     Reps 8 -- 25  -SP     Time 8 -- 2#  -SP        Exercise 9    Exercise Name 9 -- hip abduction in sitting  -SP     Cueing 9 -- Verbal;Demo  -SP     Reps 9 -- 25  -SP        Exercise 10    Exercise Name 10 -- sit to stand from elevated surface (BOSU)  -SP     Cueing 10 -- Verbal;Demo  -SP     Reps 10 -- 20  -SP        Exercise 11    Exercise Name 11 -- heel raises  -SP     Cueing 11 --  "Verbal;Demo  -SP     Reps 11 -- 25  -SP        Exercise 12    Exercise Name 12 -- step ups 6\"  -SP     Cueing 12 -- Verbal;Demo  -SP     Reps 12 -- 15 x each  -SP        Exercise 13    Exercise Name 13 -- side steps along rail  -SP     Cueing 13 -- Verbal;Demo  -SP           User Key  (r) = Recorded By, (t) = Taken By, (c) = Cosigned By    Initials Name Provider Type    Lita Riojas, PT Physical Therapist                                 Therapy Education  Given: HEP  Program: Reinforced  How Provided: Verbal  Provided to: Patient  Level of Understanding: Verbalized, Demonstrated              Time Calculation:   Start Time: 1040  Stop Time: 1145  Time Calculation (min): 65 min  Timed Charges  59172 - PT Therapeutic Exercise Minutes: 15  Total Minutes  Timed Charges Total Minutes: 15   Total Minutes: 15  Therapy Charges for Today     Code Description Service Date Service Provider Modifiers Qty    [id number]  PT THER PROC EA 15 MIN 11/9/2022 Lita Mckeon, PT GP 1                    Lita Mckeon, PT  11/9/2022     "

## 2022-11-10 ENCOUNTER — OFFICE VISIT (OUTPATIENT)
Dept: ORTHOPEDIC SURGERY | Facility: CLINIC | Age: 82
End: 2022-11-10

## 2022-11-10 VITALS — HEIGHT: 70 IN | BODY MASS INDEX: 25.05 KG/M2 | WEIGHT: 175 LBS

## 2022-11-10 DIAGNOSIS — Z96.641 HISTORY OF RIGHT HIP REPLACEMENT: Primary | ICD-10-CM

## 2022-11-10 PROCEDURE — 99024 POSTOP FOLLOW-UP VISIT: CPT | Performed by: INTERNAL MEDICINE

## 2022-11-10 NOTE — PROGRESS NOTES
"Subjective:     Patient ID: Arjun Plaza is a 82 y.o. male.    Chief Complaint:    History of Present Illness  Arjun Plaza returns to clinic today for evaluation of right total hip 2 weeks ago.  The patient is doing quite well and is very happy with the result.  He is ambulatory today with a cane but he says he does not really use it he just carries it around in case he needs it.  He denies any drainage or redness around his incision.  He is very happy with the result.     Social History     Occupational History   • Not on file   Tobacco Use   • Smoking status: Never   • Smokeless tobacco: Never   Vaping Use   • Vaping Use: Never used   Substance and Sexual Activity   • Alcohol use: No   • Drug use: Never   • Sexual activity: Yes     Partners: Female      Past Medical History:   Diagnosis Date   • Arthritis     OA right hip, left shoulder   • Cancer (HCC) basal cell   • GERD (gastroesophageal reflux disease)    • Hip arthrosis 6 momths ago   • HL (hearing loss)    • Hyperlipidemia    • Hypertension      Past Surgical History:   Procedure Laterality Date   • COLONOSCOPY  2014   • EYE SURGERY Left     eyelid place removed   • HAND SURGERY Right    • HIP SURGERY     • TOTAL HIP ARTHROPLASTY Right 10/26/2022    Procedure: TOTAL HIP ARTHROPLASTY ANTERIOR WITH HANA TABLE;  Surgeon: Selvin Mendoza MD;  Location: [facility];  Service: Orthopedics;  Laterality: Right;  RIGHT HIP ARTHROPLASTY ANTERIOR WITH HANA TABLE   • VASECTOMY         Family History   Problem Relation Age of Onset   • Hypertension Father    • Diabetes Father    • Diabetes Sister    • Colon cancer Neg Hx    • Colon polyps Neg Hx    • Malig Hyperthermia Neg Hx                  Objective:  Vitals:    11/10/22 1012   Weight: 79.4 kg (175 lb)   Height: 177.8 cm (70\")         11/10/22  1012   Weight: 79.4 kg (175 lb)     Body mass index is 25.11 kg/m².        Right Hip Exam     Tenderness   The patient is experiencing no tenderness.     Muscle " Strength   Flexion: 5/5     Other   Erythema: absent  Scars: present  Sensation: normal  Pulse: present    Comments:  Leg lengths clinically equal.  Incision healing with no signs of infection.  No erythema.  No fluctuance.  Minimal scabbing remains.  Steri-Strips still in place.               Imaging: none    Assessment:      No diagnosis found.       Plan:          1. Discussed treatment options at length with patient at today's visit.  The patient should continue to walk as much as he would like.  I cautioned him to not overdo it.  He should continue to take the aspirin 81 mg twice daily for a total of 6 weeks.  He should continue physical therapy as long as he thinks he is still benefiting from it.  2. Follow up: 4 weeks with a standing AP pelvis x-ray      Arjun Plaza was in agreement with plan and had all questions answered.     Medications:  No orders of the defined types were placed in this encounter.      Followup:  No follow-ups on file.    There are no diagnoses linked to this encounter.      Dictated utilizing Dragon dictation

## 2022-11-14 ENCOUNTER — HOSPITAL ENCOUNTER (OUTPATIENT)
Dept: PHYSICAL THERAPY | Facility: HOSPITAL | Age: 82
Setting detail: THERAPIES SERIES
Discharge: HOME | End: 2022-11-14

## 2022-11-14 DIAGNOSIS — M16.11 PRIMARY OSTEOARTHRITIS OF RIGHT HIP: Primary | ICD-10-CM

## 2022-11-14 PROCEDURE — 97110 THERAPEUTIC EXERCISES: CPT | Performed by: PHYSICAL THERAPIST

## 2022-11-14 NOTE — THERAPY TREATMENT NOTE
Outpatient Physical Therapy Ortho Treatment Note   Reena Owen     Patient Name: Arjun Plaza  : 1940  MRN: [id number]  Today's Date: 2022      Visit Date: 2022    Visit Dx:    ICD-10-CM ICD-9-CM   1. Primary osteoarthritis of right hip  M16.11 715.15       Patient Active Problem List   Diagnosis   • Pure hypercholesterolemia   • Essential hypertension   • Vitamin B12 deficiency   • Vitamin D deficiency   • Gastroesophageal reflux disease   • Anosmia   • Hip osteoarthritis        Past Medical History:   Diagnosis Date   • Arthritis     OA right hip, left shoulder   • Cancer (HCC) basal cell   • GERD (gastroesophageal reflux disease)    • Hip arthrosis 6 momths ago   • HL (hearing loss)    • Hyperlipidemia    • Hypertension         Past Surgical History:   Procedure Laterality Date   • COLONOSCOPY     • EYE SURGERY Left     eyelid place removed   • HAND SURGERY Right    • HIP SURGERY     • TOTAL HIP ARTHROPLASTY Right 10/26/2022    Procedure: TOTAL HIP ARTHROPLASTY ANTERIOR WITH HANA TABLE;  Surgeon: Selvin Mendoza MD;  Location: [facility];  Service: Orthopedics;  Laterality: Right;  RIGHT HIP ARTHROPLASTY ANTERIOR WITH HANA TABLE   • VASECTOMY          PT Ortho     Row Name 22 1200       Subjective Comments    Subjective Comments Patient states that he is doing well, other than when he first lies down at night.  He has mild right hip pain for an hour or so before he is able to comfortably fall asleep.  -SP          User Key  (r) = Recorded By, (t) = Taken By, (c) = Cosigned By    Initials Name Provider Type    Lita Riojas, PT Physical Therapist                             PT Assessment/Plan     Row Name 22 1233          PT Assessment    Assessment Comments Patient is progressing well.  He reports minimall overall pain and has little difficulty with functional mobility.  -SP        PT Plan    PT Plan Comments Continue to progress as tolerable  -SP      "      User Key  (r) = Recorded By, (t) = Taken By, (c) = Cosigned By    Initials Name Provider Type    Lita Riojas, PT Physical Therapist                   OP Exercises     Row Name 11/14/22 1234 11/14/22 1200          Subjective Comments    Subjective Comments -- Patient states that he is doing well, other than when he first lies down at night.  He has mild right hip pain for an hour or so before he is able to comfortably fall asleep.  -SP        Total Minutes    75025 - PT Therapeutic Exercise Minutes 30  -SP --        Exercise 1    Exercise Name 1 -- glute set  -SP     Cueing 1 -- Verbal;Tactile  -SP     Reps 1 -- 15  -SP     Time 1 -- 5 sec  -SP        Exercise 2    Exercise Name 2 -- sidelying clams  -SP     Cueing 2 -- Verbal;Tactile  -SP     Reps 2 -- 20  -SP        Exercise 3    Exercise Name 3 -- SAQ  -SP     Cueing 3 -- Verbal;Tactile  -SP     Reps 3 -- 30  -SP     Time 3 -- 2#  -SP        Exercise 4    Exercise Name 4 -- sidelying hip abduction  -SP     Cueing 4 -- Verbal;Tactile  -SP     Reps 4 -- 20  -SP        Exercise 5    Exercise Name 5 -- supine clams  -SP     Cueing 5 -- Verbal;Tactile  -SP     Reps 5 -- 20  -SP     Time 5 -- 3 sec  -SP     Additional Comments -- blue tband  -SP        Exercise 6    Exercise Name 6 -- hip adduction ball squeeze  -SP     Cueing 6 -- Verbal;Tactile  -SP     Reps 6 -- 20  -SP     Time 6 -- 3 sec  -SP        Exercise 7    Exercise Name 7 -- heel slide  -SP     Cueing 7 -- Verbal;Tactile  -SP     Time 7 -- 5 min  -SP        Exercise 8    Exercise Name 8 -- LAQ  -SP     Cueing 8 -- Verbal;Demo  -SP     Reps 8 -- 30  -SP     Time 8 -- 2#  -SP        Exercise 10    Exercise Name 10 -- sit to stand from elevated surface (BOSU)  -SP     Cueing 10 -- Verbal;Demo  -SP     Reps 10 -- 25  -SP        Exercise 11    Exercise Name 11 -- heel raises  -SP     Cueing 11 -- Verbal;Demo  -SP     Reps 11 -- 25  -SP        Exercise 12    Exercise Name 12 -- step ups 6\"  " -SP     Cueing 12 -- Verbal;Demo  -SP     Reps 12 -- 15 x each  -SP        Exercise 13    Exercise Name 13 -- side steps along rail  -SP     Cueing 13 -- Verbal;Demo  -SP     Reps 13 -- 5  -SP        Exercise 14    Exercise Name 14 -- recumbent bike seat 6  -SP     Cueing 14 -- Verbal  -SP     Time 14 -- 6 min  -SP           User Key  (r) = Recorded By, (t) = Taken By, (c) = Cosigned By    Initials Name Provider Type    Lita Riojas, PT Physical Therapist                                                Time Calculation:   Start Time: 1140  Stop Time: 1234  Time Calculation (min): 54 min  Timed Charges  97413 - PT Therapeutic Exercise Minutes: 30  Total Minutes  Timed Charges Total Minutes: 30   Total Minutes: 30  Therapy Charges for Today     Code Description Service Date Service Provider Modifiers Qty    [id number]  PT THER PROC EA 15 MIN 11/14/2022 Lita Mckeon, PT GP 2                    Lita Mckeon, PT  11/14/2022

## 2022-11-16 ENCOUNTER — HOSPITAL ENCOUNTER (OUTPATIENT)
Dept: PHYSICAL THERAPY | Facility: HOSPITAL | Age: 82
Setting detail: THERAPIES SERIES
Discharge: HOME | End: 2022-11-16

## 2022-11-16 DIAGNOSIS — M16.11 PRIMARY OSTEOARTHRITIS OF RIGHT HIP: Primary | ICD-10-CM

## 2022-11-16 PROCEDURE — 97110 THERAPEUTIC EXERCISES: CPT | Performed by: PHYSICAL THERAPIST

## 2022-11-21 ENCOUNTER — HOSPITAL ENCOUNTER (OUTPATIENT)
Dept: PHYSICAL THERAPY | Facility: HOSPITAL | Age: 82
Setting detail: THERAPIES SERIES
Discharge: HOME | End: 2022-11-21

## 2022-11-21 DIAGNOSIS — M16.11 PRIMARY OSTEOARTHRITIS OF RIGHT HIP: Primary | ICD-10-CM

## 2022-11-21 PROCEDURE — 97110 THERAPEUTIC EXERCISES: CPT | Performed by: PHYSICAL THERAPIST

## 2022-11-21 NOTE — THERAPY TREATMENT NOTE
"    Outpatient Physical Therapy Ortho Treatment Note   Reena Owen     Patient Name: Arjun Plaza  : 1940  MRN: [id number]  Today's Date: 2022      Visit Date: 2022    Visit Dx:    ICD-10-CM ICD-9-CM   1. Primary osteoarthritis of right hip  M16.11 715.15       Patient Active Problem List   Diagnosis   • Pure hypercholesterolemia   • Essential hypertension   • Vitamin B12 deficiency   • Vitamin D deficiency   • Gastroesophageal reflux disease   • Anosmia   • Hip osteoarthritis        Past Medical History:   Diagnosis Date   • Arthritis     OA right hip, left shoulder   • Cancer (HCC) basal cell   • GERD (gastroesophageal reflux disease)    • Hip arthrosis 6 momths ago   • HL (hearing loss)    • Hyperlipidemia    • Hypertension         Past Surgical History:   Procedure Laterality Date   • COLONOSCOPY     • EYE SURGERY Left     eyelid place removed   • HAND SURGERY Right    • HIP SURGERY     • TOTAL HIP ARTHROPLASTY Right 10/26/2022    Procedure: TOTAL HIP ARTHROPLASTY ANTERIOR WITH HANA TABLE;  Surgeon: Selvin Mendoza MD;  Location: [facility];  Service: Orthopedics;  Laterality: Right;  RIGHT HIP ARTHROPLASTY ANTERIOR WITH HANA TABLE   • VASECTOMY          PT Ortho     Row Name 22 1040       Subjective Comments    Subjective Comments Patient reports that he is doing well, other than he is having intermittent hip flexor and quad area \"muscle spasm\"  -SP          User Key  (r) = Recorded By, (t) = Taken By, (c) = Cosigned By    Initials Name Provider Type    Lita Riojas, PT Physical Therapist                             PT Assessment/Plan     Row Name 22 1320          PT Assessment    Assessment Comments Patient progressing well.  He demonstrates improved gait, presenting without assistive device today.  -SP        PT Plan    PT Plan Comments Continue to progress as tolerable  -SP           User Key  (r) = Recorded By, (t) = Taken By, (c) = Cosigned By    " "Initials Name Provider Type    SP Lita Mckeon, PT Physical Therapist                   OP Exercises     Row Name 11/21/22 1328 11/21/22 1040          Subjective Comments    Subjective Comments -- Patient reports that he is doing well, other than he is having intermittent hip flexor and quad area \"muscle spasm\"  -SP        Total Minutes    51405 - PT Therapeutic Exercise Minutes 30  -SP --        Exercise 1    Exercise Name 1 -- glute set  -SP     Cueing 1 -- Verbal;Tactile  -SP     Reps 1 -- 20  -SP     Time 1 -- 5 sec  -SP        Exercise 2    Exercise Name 2 -- sidelying clams  -SP     Cueing 2 -- Verbal;Tactile  -SP     Reps 2 -- 40  -SP        Exercise 3    Exercise Name 3 -- SAQ  -SP     Cueing 3 -- Verbal;Tactile  -SP     Reps 3 -- 40  -SP     Time 3 -- 4#  -SP        Exercise 4    Exercise Name 4 -- sidelying hip abduction  -SP     Cueing 4 -- Verbal;Tactile  -SP     Reps 4 -- 20  -SP        Exercise 5    Exercise Name 5 -- supine clams  -SP     Cueing 5 -- Verbal;Tactile  -SP     Reps 5 -- 40  -SP     Time 5 -- 3 sec  -SP     Additional Comments -- black tband  -SP        Exercise 6    Exercise Name 6 -- hip adduction ball squeeze  -SP     Cueing 6 -- Verbal;Tactile  -SP     Reps 6 -- 30  -SP     Time 6 -- 3 sec  -SP        Exercise 7    Exercise Name 7 -- heel slide on ball  -SP     Cueing 7 -- Verbal;Tactile  -SP     Reps 7 -- 30  -SP        Exercise 8    Exercise Name 8 -- LAQ  -SP     Cueing 8 -- Verbal;Demo  -SP     Reps 8 -- 30  -SP     Time 8 -- 2#  -SP        Exercise 10    Exercise Name 10 -- sit to stand from elevated surface ([facility]OSU)  -SP     Cueing 10 -- Verbal;Demo  -SP     Reps 10 -- 25  -SP        Exercise 11    Exercise Name 11 -- heel raises  -SP     Cueing 11 -- Verbal;Demo  -SP     Reps 11 -- 30  -SP        Exercise 12    Exercise Name 12 -- step ups 6\"  -SP     Cueing 12 -- Verbal;Demo  -SP     Reps 12 -- 15 x each  -SP        Exercise 13    Exercise Name 13 -- side steps along " "rail  -SP     Cueing 13 -- Verbal;Demo  -SP     Reps 13 -- 5  -SP     Time 13 -- green tband  -SP        Exercise 14    Exercise Name 14 -- recumbent bike seat 6  -SP     Cueing 14 -- Verbal  -SP     Time 14 -- 6 min  -SP        Exercise 15    Exercise Name 15 -- lateral step over 6\"  -SP     Cueing 15 -- Verbal  -SP     Reps 15 -- 10  -SP           User Key  (r) = Recorded By, (t) = Taken By, (c) = Cosigned By    Initials Name Provider Type    Lita Riojas, PT Physical Therapist                                 Therapy Education  Given: HEP  Program: Reinforced  How Provided: Verbal  Provided to: Patient  Level of Understanding: Verbalized, Demonstrated              Time Calculation:   Start Time: 1040  Stop Time: 1145  Time Calculation (min): 65 min  Timed Charges  05249 - PT Therapeutic Exercise Minutes: 30  Total Minutes  Timed Charges Total Minutes: 30   Total Minutes: 30  Therapy Charges for Today     Code Description Service Date Service Provider Modifiers Qty    [id number] HC PT THER PROC EA 15 MIN 11/21/2022 Lita Mckeon, PT GP 2                    Lita Mckeon, PT  11/21/2022     "

## 2022-11-22 ENCOUNTER — TELEPHONE (OUTPATIENT)
Dept: ORTHOPEDIC SURGERY | Facility: CLINIC | Age: 82
End: 2022-11-22

## 2022-11-22 RX ORDER — CYCLOBENZAPRINE HCL 5 MG
5 TABLET ORAL 3 TIMES DAILY PRN
Qty: 30 TABLET | Refills: 0 | Status: SHIPPED | OUTPATIENT
Start: 2022-11-22

## 2022-11-22 NOTE — TELEPHONE ENCOUNTER
Patient's wife calling that her  is having muscle spasms in his right thigh in the evening and at night- they are calling for further recommendations.    DX:status post PAULETTE 10.26.2022    Please advise.

## 2022-11-28 ENCOUNTER — HOSPITAL ENCOUNTER (OUTPATIENT)
Dept: PHYSICAL THERAPY | Facility: HOSPITAL | Age: 82
Setting detail: THERAPIES SERIES
Discharge: HOME | End: 2022-11-28

## 2022-11-28 DIAGNOSIS — M16.11 PRIMARY OSTEOARTHRITIS OF RIGHT HIP: Primary | ICD-10-CM

## 2022-11-28 PROCEDURE — 97110 THERAPEUTIC EXERCISES: CPT | Performed by: PHYSICAL THERAPIST

## 2022-11-28 NOTE — THERAPY RE-EVALUATION
Outpatient Physical Therapy Ortho Re-Evaluation   Reena Owen     Patient Name: Arjun Plaza  : 1940  MRN: [id number]  Today's Date: 2022      Visit Date: 2022    Patient Active Problem List   Diagnosis   • Pure hypercholesterolemia   • Essential hypertension   • Vitamin B12 deficiency   • Vitamin D deficiency   • Gastroesophageal reflux disease   • Anosmia   • Hip osteoarthritis        Past Medical History:   Diagnosis Date   • Arthritis     OA right hip, left shoulder   • Cancer (HCC) basal cell   • GERD (gastroesophageal reflux disease)    • Hip arthrosis 6 momths ago   • HL (hearing loss)    • Hyperlipidemia    • Hypertension         Past Surgical History:   Procedure Laterality Date   • COLONOSCOPY     • EYE SURGERY Left     eyelid place removed   • HAND SURGERY Right    • HIP SURGERY     • TOTAL HIP ARTHROPLASTY Right 10/26/2022    Procedure: TOTAL HIP ARTHROPLASTY ANTERIOR WITH HANA TABLE;  Surgeon: Selvin Mendoza MD;  Location: [facility];  Service: Orthopedics;  Laterality: Right;  RIGHT HIP ARTHROPLASTY ANTERIOR WITH HANA TABLE   • VASECTOMY         Visit Dx:     ICD-10-CM ICD-9-CM   1. Primary osteoarthritis of right hip  M16.11 715.15              PT Ortho     Row Name 22 1300       Subjective Comments    Subjective Comments Patient reports that he has had less episodes of LE cramping and muscle spasm since no longer sleeping in recliner.  Patient reports that he is now sleeping in bed and has not had any more cramps.  Patient is consistently ambulating without an assistive device.  He is not having any significant soreness following exercises and feels his strength is improving.  Patient reports minimal to no soreness.  -SP       Posture/Observations    Observations Edema;Incision healing;Muscle atrophy  -SP    Posture/Observations Comments Patient with moderate edema at anterior lateral right hip; incision with good closure and covered with steri strips.   Generalized atrophy of right glute and hip musculature.  -SP       Hip/Thigh Palpation    Greater Trochanter Right:;Tender  -SP    Anterior Capsule Right:;Tender;Guarded/taut  -SP    Iliopsoas Right:;Tender;Guarded/taut  -SP       Right Lower Ext    Rt Hip ABduction AROM 20 degrees  -SP    Rt Hip ADduction AROM 15 degrees  -SP    Rt Hip Extension AROM 5 degrees  -SP    Rt Hip Flexion AROM 95 degrees  -SP    Rt Knee Extension/Flexion AROM 0 to 127 degrees  -SP    Rt Ankle Dorsiflexion AROM WFL  -SP    Rt Ankle Plantarflexion AROM WFL  -SP       MMT Right Lower Ext    Rt Hip Flexion MMT, Gross Movement (4-/5) good minus  -SP    Rt Hip Extension MMT, Gross Movement (4/5) good  -SP    Rt Hip ABduction MMT, Gross Movement (4-/5) good minus  -SP    Rt Hip ADduction MMT, Gross Movement (4/5) good  -SP    Rt Knee Extension MMT, Gross Movement (4/5) good  -SP    Rt Knee Flexion MMT, Gross Movement (4/5) good  -SP    Rt Ankle Plantarflexion MMT, Gross Movement (4/5) good  -SP    Rt Ankle Dorsiflexion MMT, Gross Movement (4/5) good  -SP       MMT Left Lower Ext    Lt Hip Flexion MMT, Gross Movement (4+/5) good plus  -SP    Lt Hip Extension MMT, Gross Movement (4+/5) good plus  -SP    Lt Hip ABduction MMT, Gross Movement (4+/5) good plus  -SP    Lt Hip ADduction MMT, Gross Movement (4+/5) good plus  -SP    Lt Knee Extension MMT, Gross Movement (4+/5) good plus  -SP    Lt Knee Flexion MMT, Gross Movement (4+/5) good plus  -SP       Sensation    Sensation WNL? WFL  -SP       Lower Extremity Flexibility    Hamstrings Right:;Mildly limited  -SP    Hip Flexors Right:;Moderately limited  -SP    Hip External Rotators Right:;Moderately limited  -SP    Hip Internal Rotators Right:;Moderately limited  -SP       Transfers    Bed-Chair [place] (Transfers) independent  -SP    Chair-Bed [place] (Transfers) independent  -SP    Sit-Stand [place] (Transfers) independent  -SP    Stand-Sit [place] (Transfers) independent  -SP        Gait/Stairs (Locomotion)    [place] Level (Gait) independent  -SP    Assistive Device (Gait) --  uses cane for community or unlevel surfaces  -SP    Deviations/Abnormal Patterns (Gait) gait speed decreased;stride length decreased  -SP    Bilateral Gait Deviations forward flexed posture  mild  -SP    Ascending Technique (Stairs) step-over-step  -SP    Descending Technique (Stairs) step-over-step  -SP    Comment, (Gait/Stairs) Patient intermittently using SPC for community ambulation  -SP          User Key  (r) = Recorded By, (t) = Taken By, (c) = Cosigned By    Initials Name Provider Type    SP Lita Mckeon, PT Physical Therapist                            Therapy Education  Given: HEP  Program: Reinforced  How Provided: Verbal  Provided to: Patient  Level of Understanding: Verbalized, Demonstrated      PT OP Goals     Row Name 11/28/22 1300          PT Short Term Goals    STG Date to Achieve 12/13/22  -SP     STG 1 Patient able to transfers supine to sit without increased time or difficulty  -SP     STG 1 Progress Met  -SP     STG 2 Patient able to transfer sit to stand with decreased use of upper extremities to assist  -SP     STG 2 Progress Met  -SP     STG 3 Patient ambulates level surfaces safely without assistive device  -SP     STG 3 Progress Met  -SP        Long Term Goals    LTG Date to Achieve 12/28/22  -SP     LTG 1 Patient ambulates stairs with normal reciprocal gait pattern  -SP     LTG 1 Progress Partially Met;Ongoing  -SP     LTG 2 Patient  demonstrates right LE strength increased by one muscle grade  -SP     LTG 2 Progress Partially Met;Ongoing  -SP     LTG 3 Patient able to ambulate on unlevel surfaces and community distances safely and without increased symptoms  -SP     LTG 3 Progress Partially Met;Ongoing  -SP     LTG 4 Patient independent with HEP  -SP     LTG 4 Progress Met  -SP           User Key  (r) = Recorded By, (t) = Taken By, (c) = Cosigned By    Initials Name  Provider Type    Lita Riojas, PT Physical Therapist                 PT Assessment/Plan     Row Name 11/28/22 1333          PT Assessment    Assessment Comments Patient exhibits good AROM of right hip and progressing strength.  Patient is able to consistently ambulate level surfaces without assistive device safely.  Patient has met all short term goals and is making progress toward long term goals.  -SP     Rehab Potential Good  -SP        PT Plan    PT Frequency 1x/week;2x/week  -SP     Predicted Duration of Therapy Intervention (PT) 3-4 weeks decreasing from 2x/week to weekly over the next 3-4 weeks then HEP  -SP     PT Plan Comments Continue to progress LE strengthening and mobility to improve functional status  -SP           User Key  (r) = Recorded By, (t) = Taken By, (c) = Cosigned By    Initials Name Provider Type    Lita Riojas, PT Physical Therapist                   OP Exercises     Row Name 11/28/22 1971 11/28/22 1300          Subjective Comments    Subjective Comments -- Patient reports that he has had less episodes of LE cramping and muscle spasm since no longer sleeping in recliner.  Patient reports that he is now sleeping in bed and has not had any more cramps.  Patient is consistently ambulating without an assistive device.  He is not having any significant soreness following exercises and feels his strength is improving.  Patient reports minimal to no soreness.  -SP        Total Minutes    72286 - PT Therapeutic Exercise Minutes 15  -SP --        Exercise 1    Exercise Name 1 -- glute set  -SP     Cueing 1 -- Verbal;Tactile  -SP     Reps 1 -- 20  -SP     Time 1 -- 5 sec  -SP        Exercise 2    Exercise Name 2 -- sidelying clams  -SP     Cueing 2 -- Verbal;Tactile  -SP     Reps 2 -- 40  -SP     Additional Comments -- black tband  -SP        Exercise 3    Exercise Name 3 -- SAQ  -SP     Cueing 3 -- Verbal;Tactile  -SP     Reps 3 -- 40  -SP     Time 3 -- 4#  -SP         "Exercise 4    Exercise Name 4 -- sidelying hip abduction  -SP     Cueing 4 -- Verbal;Tactile  -SP     Reps 4 -- 30  -SP        Exercise 5    Exercise Name 5 -- supine clams  -SP     Cueing 5 -- Verbal;Tactile  -SP     Reps 5 -- 40  -SP     Time 5 -- 3 sec  -SP     Additional Comments -- black tband  -SP        Exercise 6    Exercise Name 6 -- hip adduction ball squeeze  -SP     Cueing 6 -- Verbal;Tactile  -SP     Reps 6 -- 30  -SP     Time 6 -- 3 sec  -SP        Exercise 7    Exercise Name 7 -- heel slide on ball  -SP     Cueing 7 -- Verbal;Tactile  -SP     Reps 7 -- 30  -SP        Exercise 8    Exercise Name 8 -- LAQ  -SP     Cueing 8 -- Verbal;Demo  -SP     Reps 8 -- 30  -SP     Time 8 -- 2#  -SP        Exercise 10    Exercise Name 10 -- sit to stand from elevated surface (BOSU)  -SP     Cueing 10 -- Verbal;Demo  -SP     Reps 10 -- 30  -SP        Exercise 11    Exercise Name 11 -- heel raises  -SP     Cueing 11 -- Verbal;Demo  -SP     Reps 11 -- 30  -SP        Exercise 12    Exercise Name 12 -- step ups 6\"  -SP     Cueing 12 -- Verbal;Demo  -SP     Reps 12 -- 15 x each  -SP        Exercise 13    Exercise Name 13 -- side steps along rail  -SP     Cueing 13 -- Verbal;Demo  -SP     Reps 13 -- 5  -SP     Time 13 -- black tband  -SP        Exercise 14    Exercise Name 14 -- recumbent bike seat 6  -SP     Cueing 14 -- Verbal  -SP     Time 14 -- 6 min  -SP        Exercise 15    Exercise Name 15 -- lateral step over 6\"  -SP     Cueing 15 -- Verbal  -SP     Reps 15 -- 10  -SP           User Key  (r) = Recorded By, (t) = Taken By, (c) = Cosigned By    Initials Name Provider Type    Lita Riojas, PT Physical Therapist                                        Time Calculation:     Start Time: 1025  Stop Time: 1130  Time Calculation (min): 65 min  Timed Charges  38776 - PT Therapeutic Exercise Minutes: 15  Total Minutes  Timed Charges Total Minutes: 15   Total Minutes: 15     Therapy Charges for Today     Code " Description Service Date Service Provider Modifiers Qty    [id number]  PT THER PROC EA 15 MIN 11/28/2022 Lita Mckeon, PT GP 1                    Lita Mckeon, PT  11/28/2022

## 2022-11-30 ENCOUNTER — HOSPITAL ENCOUNTER (OUTPATIENT)
Dept: PHYSICAL THERAPY | Facility: HOSPITAL | Age: 82
Setting detail: THERAPIES SERIES
Discharge: HOME | End: 2022-11-30

## 2022-11-30 DIAGNOSIS — M16.11 PRIMARY OSTEOARTHRITIS OF RIGHT HIP: Primary | ICD-10-CM

## 2022-11-30 PROCEDURE — 97110 THERAPEUTIC EXERCISES: CPT | Performed by: PHYSICAL THERAPIST

## 2022-12-08 ENCOUNTER — OFFICE VISIT (OUTPATIENT)
Dept: ORTHOPEDIC SURGERY | Facility: CLINIC | Age: 82
End: 2022-12-08

## 2022-12-08 VITALS — HEIGHT: 70 IN | BODY MASS INDEX: 25.05 KG/M2 | WEIGHT: 175 LBS

## 2022-12-08 DIAGNOSIS — M19.012 GLENOHUMERAL ARTHRITIS, LEFT: ICD-10-CM

## 2022-12-08 DIAGNOSIS — Z96.641 HISTORY OF RIGHT HIP REPLACEMENT: Primary | ICD-10-CM

## 2022-12-08 PROCEDURE — 20610 DRAIN/INJ JOINT/BURSA W/O US: CPT | Performed by: INTERNAL MEDICINE

## 2022-12-08 PROCEDURE — 99024 POSTOP FOLLOW-UP VISIT: CPT | Performed by: INTERNAL MEDICINE

## 2022-12-08 PROCEDURE — 72170 X-RAY EXAM OF PELVIS: CPT | Performed by: INTERNAL MEDICINE

## 2022-12-08 RX ORDER — TRIAMCINOLONE ACETONIDE 40 MG/ML
80 INJECTION, SUSPENSION INTRA-ARTICULAR; INTRAMUSCULAR
Status: COMPLETED | OUTPATIENT
Start: 2022-12-08 | End: 2022-12-08

## 2022-12-08 RX ORDER — LIDOCAINE HYDROCHLORIDE 10 MG/ML
4 INJECTION, SOLUTION EPIDURAL; INFILTRATION; INTRACAUDAL; PERINEURAL
Status: COMPLETED | OUTPATIENT
Start: 2022-12-08 | End: 2022-12-08

## 2022-12-08 RX ORDER — AMOXICILLIN 500 MG/1
2000 CAPSULE ORAL ONCE
Qty: 4 CAPSULE | Refills: 0 | Status: SHIPPED | OUTPATIENT
Start: 2022-12-08 | End: 2022-12-08

## 2022-12-08 RX ADMIN — LIDOCAINE HYDROCHLORIDE 4 ML: 10 INJECTION, SOLUTION EPIDURAL; INFILTRATION; INTRACAUDAL; PERINEURAL at 11:02

## 2022-12-08 RX ADMIN — TRIAMCINOLONE ACETONIDE 80 MG: 40 INJECTION, SUSPENSION INTRA-ARTICULAR; INTRAMUSCULAR at 11:02

## 2022-12-08 NOTE — PROGRESS NOTES
Subjective:     Patient ID: Arjun Plaza is a 82 y.o. male.    Chief Complaint:    History of Present Illness  Arjun Plaza returns to clinic today for evaluation of right hip osteoarthritis 6 weeks status post right total hip arthroplasty.  The patient is doing very well and has no complaints.  He is extremely pleased with the outcome and is hopeful to be released without restrictions.  He is ambulatory without an assistive device states his incision is healed and is only complaining of left shoulder pain.  The patient used to see Dr. Gallardo in the past for his left shoulder and had injected about a year ago.  The patient is hopeful to get another left shoulder injection today.     Social History     Occupational History   • Not on file   Tobacco Use   • Smoking status: Never   • Smokeless tobacco: Never   Vaping Use   • Vaping Use: Never used   Substance and Sexual Activity   • Alcohol use: No   • Drug use: Never   • Sexual activity: Yes     Partners: Female      Past Medical History:   Diagnosis Date   • Arthritis     OA right hip, left shoulder   • Cancer (HCC) basal cell   • GERD (gastroesophageal reflux disease)    • Hip arthrosis 6 momths ago   • HL (hearing loss)    • Hyperlipidemia    • Hypertension      Past Surgical History:   Procedure Laterality Date   • COLONOSCOPY  2014   • EYE SURGERY Left     eyelid place removed   • HAND SURGERY Right    • HIP SURGERY     • TOTAL HIP ARTHROPLASTY Right 10/26/2022    Procedure: TOTAL HIP ARTHROPLASTY ANTERIOR WITH HANA TABLE;  Surgeon: Selvin Mendoza MD;  Location: [facility];  Service: Orthopedics;  Laterality: Right;  RIGHT HIP ARTHROPLASTY ANTERIOR WITH HANA TABLE   • VASECTOMY         Family History   Problem Relation Age of Onset   • Hypertension Father    • Diabetes Father    • Diabetes Sister    • Colon cancer Neg Hx    • Colon polyps Neg Hx    • Malig Hyperthermia Neg Hx                  Objective:  Vitals:    12/08/22 1032   Weight: 79.4 kg  "(175 lb)   Height: 177.8 cm (70\")         12/08/22  1032   Weight: 79.4 kg (175 lb)     Body mass index is 25.11 kg/m².        Right Hip Exam     Tenderness   The patient is experiencing no tenderness.     Range of Motion   Flexion: normal   External rotation: normal   Internal rotation: normal     Muscle Strength   Flexion: 5/5     Other   Erythema: absent  Scars: present  Sensation: normal  Pulse: present    Comments:  Decision healed with no signs of infection      Left Shoulder Exam     Tenderness   Left shoulder tenderness location: Local tenderness with range of motion.    Range of Motion   Extension: abnormal   External rotation: abnormal   Forward flexion: abnormal     Muscle Strength   Abduction: 5/5   Internal rotation: 5/5   External rotation: 5/5   Supraspinatus: 5/5   Subscapularis: 5/5   Biceps: 5/5     Other   Erythema: absent  Scars: absent  Sensation: normal  Pulse: present                Imaging: AP pelvis was ordered and reviewed by myself in the office today  Indication: Status post hip replacement  Findings: xrays demonstrate a right total hip arthroplasty in expected position with no signs of component wear loosening migration fracture or dislocation.  Comparative studies: Immediate postoperative films    Assessment:        1. History of right hip replacement    2. Glenohumeral arthritis, left           Plan:    Large Joint Arthrocentesis: L glenohumeral  Date/Time: 12/8/2022 11:02 AM  Consent given by: patient  Site marked: site marked  Timeout: Immediately prior to procedure a time out was called to verify the correct patient, procedure, equipment, support staff and site/side marked as required   Supporting Documentation  Indications: pain   Procedure Details  Location: shoulder - L glenohumeral  Preparation: Patient was prepped and draped in the usual sterile fashion  Needle size: 22 G  Approach: posterior  Medications administered: 80 mg triamcinolone acetonide 40 MG/ML; 4 mL lidocaine PF " 1% 1 %  Patient tolerance: patient tolerated the procedure well with no immediate complications                  1. Discussed treatment options at length with patient at today's visit.  I gave the patient a left shoulder glenohumeral joint injection today.  He may get another injection in 3 months.  I discussed with the patient that he may resume driving and activities as tolerated with his right lower extremity without restriction.  I cautioned him to take antibiotics prior to dental work.  I gave him prescription for amoxicillin 2 g today.  I will see the patient back in 1 year after surgery in October 2023  2. Follow up: October 2023 with AP pelvis AP hip and lateral hip x-rays      Arjun Plaza was in agreement with plan and had all questions answered.     Medications:  New Medications Ordered This Visit   Medications   • amoxicillin (AMOXIL) 500 MG capsule     Sig: Take 4 capsules by mouth 1 (One) Time for 1 dose. Take 4 tablets 1 hr prior to dental work     Dispense:  4 capsule     Refill:  0       Followup:  No follow-ups on file.    Diagnoses and all orders for this visit:    1. History of right hip replacement (Primary)  -     XR Pelvis 1 or 2 View    2. Glenohumeral arthritis, left    Other orders  -     Large Joint Arthrocentesis: L glenohumeral  -     amoxicillin (AMOXIL) 500 MG capsule; Take 4 capsules by mouth 1 (One) Time for 1 dose. Take 4 tablets 1 hr prior to dental work  Dispense: 4 capsule; Refill: 0          Dictated utilizing Dragon dictation   Answers for HPI/ROS submitted by the patient on 12/1/2022  What is the primary reason for your visit?: Other  Please describe your symptoms.: SURGERY ON RIGHT HIP ON OCTOBER 26, 2022 TO BE RELEASED!  Have you had these symptoms before?: No  How long have you been having these symptoms?: Greater than 2 weeks  Please list any medications you are currently taking for this condition.: NOTHING  Please describe any probable cause for these symptoms. : HIP  WAS WORN OUT BEFORE SURGERY

## 2023-01-19 NOTE — TELEPHONE ENCOUNTER
Rx Refill Note  Requested Prescriptions     Pending Prescriptions Disp Refills   • amoxicillin (AMOXIL) 500 MG capsule 20 capsule 0     Sig: Take 4 capsules by mouth 1 (One) Time for 1 dose.      Last office visit with prescribing clinician: 12/8/2022      Next office visit with prescribing clinician: 10/10/2023       DX: status post total left hip 10.26.2022    Krysta Dawson MA  01/19/23, 16:07 EST     Patient has dental and dermatology appts coming up (wife reports his dermatologist told him he needs abx before due to his hip).    RX pended for your approval, change or denial.

## 2023-01-20 RX ORDER — AMOXICILLIN 500 MG/1
2000 CAPSULE ORAL ONCE
Qty: 20 CAPSULE | Refills: 0 | Status: SHIPPED | OUTPATIENT
Start: 2023-01-20 | End: 2023-01-20

## 2024-03-01 ENCOUNTER — TELEPHONE (OUTPATIENT)
Dept: ORTHOPEDIC SURGERY | Facility: CLINIC | Age: 84
End: 2024-03-01
Payer: MEDICARE

## 2024-03-01 DIAGNOSIS — Z96.641 HISTORY OF RIGHT HIP REPLACEMENT: Primary | ICD-10-CM

## 2024-03-01 NOTE — TELEPHONE ENCOUNTER
Rx Refill Note  Requested Prescriptions     Pending Prescriptions Disp Refills    cephalexin (KEFLEX) 500 MG capsule 4 capsule 0     Sig: Take 4 capsules by mouth 1 (One) Time As Needed (4 capsules one hour prior to dental procedure) for up to 1 dose.      Last office visit with prescribing clinician: 12/8/2022      Next office visit with prescribing clinician: Visit date not found       Encounter Diagnosis   Name Primary?    History of right hip replacement Yes      Date of Surgery:10/26/2022    Medication pended for your approval.     Marisol Fung MA  03/01/24, 14:20 EST    Previous RX pended for your approval, change or denial.     {TIP  Encounters:    {TIP  Please add Last Relevant Lab Date if appropriate:  {TIP  Is Refill Pharmacy correct?:

## 2024-03-01 NOTE — TELEPHONE ENCOUNTER
Provider: DR NOE     Caller: SUE     Relationship to Patient: WIFE ON [facility]     Pharmacy:   [facility] [id number] - [place] - [address] - [phone]  - [phone]    [address] [place] [address]   Phone: [phone] Fax: [phone]   Hours: Not open 24 hours       Phone Number: [phone]    Reason for Call: PATIENT HAS DENTAL WORK SCHEDULED 03/05/2024 AND NEEDS RX.  PATIENT REQUESTING RX BE CALLED IN TODAY 03/01/2024 SO THEY WILL KNOW IF HE CAN KEEP DENTAL APPOINTMENT .  PLEASE CALL PATIENT TO CONFIRM STATUS OF REQUEST    When was the patient last seen: 12/08/2022    **SURGERY COLEMAN ROSARIO DOS 10.26.22

## 2024-03-02 RX ORDER — CEPHALEXIN 500 MG/1
2000 CAPSULE ORAL ONCE AS NEEDED
Qty: 4 CAPSULE | Refills: 0 | Status: SHIPPED | OUTPATIENT
Start: 2024-03-02

## 2025-07-24 ENCOUNTER — ANESTHESIA EVENT (OUTPATIENT)
Age: 85
End: 2025-07-24
Payer: MEDICARE

## 2025-07-26 ENCOUNTER — HOSPITAL ENCOUNTER (OUTPATIENT)
Dept: CARDIOLOGY | Facility: HOSPITAL | Age: 85
Discharge: HOME | End: 2025-07-26
Payer: MEDICARE

## 2025-07-26 DIAGNOSIS — Z01.818 PREOP TESTING: ICD-10-CM

## 2025-07-26 LAB
QT INTERVAL: 364 MS
QTC INTERVAL: 439 MS

## 2025-07-26 PROCEDURE — 93005 ELECTROCARDIOGRAM TRACING: CPT | Performed by: STUDENT IN AN ORGANIZED HEALTH CARE EDUCATION/TRAINING PROGRAM

## 2025-08-05 ENCOUNTER — ANESTHESIA (OUTPATIENT)
Age: 85
End: 2025-08-05
Payer: MEDICARE

## 2025-08-05 ENCOUNTER — HOSPITAL ENCOUNTER (OUTPATIENT)
Age: 85
Setting detail: HOSPITAL OUTPATIENT SURGERY
Discharge: HOME | End: 2025-08-05
Attending: OPHTHALMOLOGY | Admitting: OPHTHALMOLOGY
Payer: MEDICARE

## 2025-08-05 VITALS
BODY MASS INDEX: 25.14 KG/M2 | HEART RATE: 86 BPM | SYSTOLIC BLOOD PRESSURE: 148 MMHG | TEMPERATURE: 97.7 F | OXYGEN SATURATION: 96 % | WEIGHT: 175.6 LBS | DIASTOLIC BLOOD PRESSURE: 90 MMHG | HEIGHT: 70 IN | RESPIRATION RATE: 16 BRPM

## 2025-08-05 DIAGNOSIS — Z01.818 PREOP TESTING: Primary | ICD-10-CM

## 2025-08-05 PROCEDURE — 25010000002 PROPOFOL 10 MG/ML EMULSION: Performed by: NURSE ANESTHETIST, CERTIFIED REGISTERED

## 2025-08-05 PROCEDURE — A4263 PERMANENT TEAR DUCT PLUG: HCPCS | Performed by: OPHTHALMOLOGY

## 2025-08-05 PROCEDURE — 25810000003 LACTATED RINGERS PER 1000 ML: Performed by: ANESTHESIOLOGY

## 2025-08-05 PROCEDURE — 25010000002 SUGAMMADEX 200 MG/2ML SOLUTION: Performed by: NURSE ANESTHETIST, CERTIFIED REGISTERED

## 2025-08-05 PROCEDURE — 25010000002 LIDOCAINE-EPINEPHRINE (PF) 2 %-1:200000 SOLUTION 20 ML VIAL: Performed by: OPHTHALMOLOGY

## 2025-08-05 PROCEDURE — 25010000002 ONDANSETRON PER 1 MG: Performed by: NURSE ANESTHETIST, CERTIFIED REGISTERED

## 2025-08-05 PROCEDURE — 25010000002 BUPIVACAINE (PF) 0.5 % SOLUTION 10 ML VIAL: Performed by: OPHTHALMOLOGY

## 2025-08-05 PROCEDURE — 25010000002 LIDOCAINE 2% SOLUTION: Performed by: NURSE ANESTHETIST, CERTIFIED REGISTERED

## 2025-08-05 PROCEDURE — 25010000002 PHENYLEPHRINE 10 MG/ML SOLUTION: Performed by: NURSE ANESTHETIST, CERTIFIED REGISTERED

## 2025-08-05 PROCEDURE — 25010000002 FAMOTIDINE 10 MG/ML SOLUTION: Performed by: ANESTHESIOLOGY

## 2025-08-05 PROCEDURE — 25010000002 DEXAMETHASONE SODIUM PHOSPHATE 20 MG/5ML SOLUTION: Performed by: NURSE ANESTHETIST, CERTIFIED REGISTERED

## 2025-08-05 PROCEDURE — 25010000002 CEFAZOLIN PER 500 MG: Performed by: OPHTHALMOLOGY

## 2025-08-05 DEVICE — LACRIMAL INTUBATION SET CRAWFORD
Type: IMPLANTABLE DEVICE | Site: LACRIMAL DUCT | Status: FUNCTIONAL
Brand: CRAWFORD

## 2025-08-05 RX ORDER — HYDROCODONE BITARTRATE AND ACETAMINOPHEN 7.5; 325 MG/1; MG/1
1 TABLET ORAL EVERY 4 HOURS PRN
Refills: 0 | Status: DISCONTINUED | OUTPATIENT
Start: 2025-08-05 | End: 2025-08-05 | Stop reason: HOSPADM

## 2025-08-05 RX ORDER — DROPERIDOL 2.5 MG/ML
0.62 INJECTION, SOLUTION INTRAMUSCULAR; INTRAVENOUS
Status: DISCONTINUED | OUTPATIENT
Start: 2025-08-05 | End: 2025-08-05 | Stop reason: HOSPADM

## 2025-08-05 RX ORDER — HYDROCODONE BITARTRATE AND ACETAMINOPHEN 5; 325 MG/1; MG/1
1 TABLET ORAL ONCE AS NEEDED
Refills: 0 | Status: DISCONTINUED | OUTPATIENT
Start: 2025-08-05 | End: 2025-08-05 | Stop reason: HOSPADM

## 2025-08-05 RX ORDER — FENTANYL CITRATE 50 UG/ML
25 INJECTION, SOLUTION INTRAMUSCULAR; INTRAVENOUS
Status: DISCONTINUED | OUTPATIENT
Start: 2025-08-05 | End: 2025-08-05 | Stop reason: HOSPADM

## 2025-08-05 RX ORDER — DIPHENHYDRAMINE HYDROCHLORIDE 50 MG/ML
12.5 INJECTION, SOLUTION INTRAMUSCULAR; INTRAVENOUS
Status: DISCONTINUED | OUTPATIENT
Start: 2025-08-05 | End: 2025-08-05 | Stop reason: HOSPADM

## 2025-08-05 RX ORDER — FLUMAZENIL 0.1 MG/ML
0.2 INJECTION INTRAVENOUS AS NEEDED
Status: DISCONTINUED | OUTPATIENT
Start: 2025-08-05 | End: 2025-08-05 | Stop reason: HOSPADM

## 2025-08-05 RX ORDER — FAMOTIDINE 10 MG/ML
20 INJECTION, SOLUTION INTRAVENOUS ONCE
Status: COMPLETED | OUTPATIENT
Start: 2025-08-05 | End: 2025-08-05

## 2025-08-05 RX ORDER — DEXAMETHASONE SODIUM PHOSPHATE 4 MG/ML
INJECTION, SOLUTION INTRA-ARTICULAR; INTRALESIONAL; INTRAMUSCULAR; INTRAVENOUS; SOFT TISSUE AS NEEDED
Status: DISCONTINUED | OUTPATIENT
Start: 2025-08-05 | End: 2025-08-05 | Stop reason: SURG

## 2025-08-05 RX ORDER — SODIUM CHLORIDE, SODIUM LACTATE, POTASSIUM CHLORIDE, CALCIUM CHLORIDE 600; 310; 30; 20 MG/100ML; MG/100ML; MG/100ML; MG/100ML
9 INJECTION, SOLUTION INTRAVENOUS CONTINUOUS
Status: DISCONTINUED | OUTPATIENT
Start: 2025-08-05 | End: 2025-08-05 | Stop reason: HOSPADM

## 2025-08-05 RX ORDER — OXYMETAZOLINE HYDROCHLORIDE 0.05 G/100ML
SPRAY NASAL AS NEEDED
Status: DISCONTINUED | OUTPATIENT
Start: 2025-08-05 | End: 2025-08-05 | Stop reason: HOSPADM

## 2025-08-05 RX ORDER — PHENYLEPHRINE HYDROCHLORIDE 10 MG/ML
INJECTION INTRAVENOUS AS NEEDED
Status: DISCONTINUED | OUTPATIENT
Start: 2025-08-05 | End: 2025-08-05 | Stop reason: SURG

## 2025-08-05 RX ORDER — ONDANSETRON 2 MG/ML
4 INJECTION INTRAMUSCULAR; INTRAVENOUS ONCE AS NEEDED
Status: DISCONTINUED | OUTPATIENT
Start: 2025-08-05 | End: 2025-08-05 | Stop reason: HOSPADM

## 2025-08-05 RX ORDER — MIDAZOLAM HYDROCHLORIDE 1 MG/ML
0.5 INJECTION, SOLUTION INTRAMUSCULAR; INTRAVENOUS
Status: DISCONTINUED | OUTPATIENT
Start: 2025-08-05 | End: 2025-08-05 | Stop reason: HOSPADM

## 2025-08-05 RX ORDER — SODIUM CHLORIDE 0.9 % (FLUSH) 0.9 %
3 SYRINGE (ML) INJECTION EVERY 12 HOURS SCHEDULED
Status: DISCONTINUED | OUTPATIENT
Start: 2025-08-05 | End: 2025-08-05 | Stop reason: HOSPADM

## 2025-08-05 RX ORDER — LIDOCAINE HYDROCHLORIDE 20 MG/ML
INJECTION, SOLUTION INFILTRATION; PERINEURAL AS NEEDED
Status: DISCONTINUED | OUTPATIENT
Start: 2025-08-05 | End: 2025-08-05 | Stop reason: SURG

## 2025-08-05 RX ORDER — SODIUM CHLORIDE 0.9 % (FLUSH) 0.9 %
3-10 SYRINGE (ML) INJECTION AS NEEDED
Status: DISCONTINUED | OUTPATIENT
Start: 2025-08-05 | End: 2025-08-05 | Stop reason: HOSPADM

## 2025-08-05 RX ORDER — PROMETHAZINE HYDROCHLORIDE 12.5 MG/1
25 TABLET ORAL ONCE AS NEEDED
Status: DISCONTINUED | OUTPATIENT
Start: 2025-08-05 | End: 2025-08-05 | Stop reason: HOSPADM

## 2025-08-05 RX ORDER — PROPOFOL 10 MG/ML
VIAL (ML) INTRAVENOUS AS NEEDED
Status: DISCONTINUED | OUTPATIENT
Start: 2025-08-05 | End: 2025-08-05 | Stop reason: SURG

## 2025-08-05 RX ORDER — ROCURONIUM BROMIDE 10 MG/ML
INJECTION, SOLUTION INTRAVENOUS AS NEEDED
Status: DISCONTINUED | OUTPATIENT
Start: 2025-08-05 | End: 2025-08-05 | Stop reason: SURG

## 2025-08-05 RX ORDER — NALOXONE HCL 0.4 MG/ML
0.2 VIAL (ML) INJECTION AS NEEDED
Status: DISCONTINUED | OUTPATIENT
Start: 2025-08-05 | End: 2025-08-05 | Stop reason: HOSPADM

## 2025-08-05 RX ORDER — ONDANSETRON 2 MG/ML
INJECTION INTRAMUSCULAR; INTRAVENOUS AS NEEDED
Status: DISCONTINUED | OUTPATIENT
Start: 2025-08-05 | End: 2025-08-05 | Stop reason: SURG

## 2025-08-05 RX ORDER — ERYTHROMYCIN 5 MG/G
OINTMENT OPHTHALMIC AS NEEDED
Status: DISCONTINUED | OUTPATIENT
Start: 2025-08-05 | End: 2025-08-05 | Stop reason: HOSPADM

## 2025-08-05 RX ORDER — LABETALOL HYDROCHLORIDE 5 MG/ML
5 INJECTION, SOLUTION INTRAVENOUS
Status: DISCONTINUED | OUTPATIENT
Start: 2025-08-05 | End: 2025-08-05 | Stop reason: HOSPADM

## 2025-08-05 RX ORDER — ERYTHROMYCIN 5 MG/G
OINTMENT OPHTHALMIC 2 TIMES DAILY
Qty: 3.5 G | Refills: 1 | Status: SHIPPED | OUTPATIENT
Start: 2025-08-05

## 2025-08-05 RX ORDER — HYDROMORPHONE HYDROCHLORIDE 1 MG/ML
0.25 INJECTION, SOLUTION INTRAMUSCULAR; INTRAVENOUS; SUBCUTANEOUS
Refills: 0 | Status: DISCONTINUED | OUTPATIENT
Start: 2025-08-05 | End: 2025-08-05 | Stop reason: HOSPADM

## 2025-08-05 RX ORDER — FENTANYL CITRATE 50 UG/ML
50 INJECTION, SOLUTION INTRAMUSCULAR; INTRAVENOUS ONCE AS NEEDED
Status: DISCONTINUED | OUTPATIENT
Start: 2025-08-05 | End: 2025-08-05 | Stop reason: HOSPADM

## 2025-08-05 RX ORDER — HYDROCODONE BITARTRATE AND ACETAMINOPHEN 5; 325 MG/1; MG/1
1 TABLET ORAL EVERY 6 HOURS PRN
Qty: 15 TABLET | Refills: 0 | Status: CANCELLED | OUTPATIENT
Start: 2025-08-05

## 2025-08-05 RX ORDER — ATROPINE SULFATE 0.4 MG/ML
0.4 INJECTION, SOLUTION INTRAMUSCULAR; INTRAVENOUS; SUBCUTANEOUS ONCE AS NEEDED
Status: DISCONTINUED | OUTPATIENT
Start: 2025-08-05 | End: 2025-08-05 | Stop reason: HOSPADM

## 2025-08-05 RX ORDER — PROMETHAZINE HYDROCHLORIDE 25 MG/1
25 SUPPOSITORY RECTAL ONCE AS NEEDED
Status: DISCONTINUED | OUTPATIENT
Start: 2025-08-05 | End: 2025-08-05 | Stop reason: HOSPADM

## 2025-08-05 RX ORDER — HYDRALAZINE HYDROCHLORIDE 20 MG/ML
5 INJECTION INTRAMUSCULAR; INTRAVENOUS
Status: DISCONTINUED | OUTPATIENT
Start: 2025-08-05 | End: 2025-08-05 | Stop reason: HOSPADM

## 2025-08-05 RX ADMIN — PHENYLEPHRINE HYDROCHLORIDE 100 MCG: 10 INJECTION INTRAVENOUS at 11:22

## 2025-08-05 RX ADMIN — PHENYLEPHRINE HYDROCHLORIDE 100 MCG: 10 INJECTION INTRAVENOUS at 11:06

## 2025-08-05 RX ADMIN — CEFAZOLIN 2000 MG: 2 INJECTION, POWDER, FOR SOLUTION INTRAMUSCULAR; INTRAVENOUS at 10:44

## 2025-08-05 RX ADMIN — SODIUM CHLORIDE, POTASSIUM CHLORIDE, SODIUM LACTATE AND CALCIUM CHLORIDE 9 ML/HR: 600; 310; 30; 20 INJECTION, SOLUTION INTRAVENOUS at 10:27

## 2025-08-05 RX ADMIN — SUGAMMADEX 200 MG: 100 INJECTION, SOLUTION INTRAVENOUS at 11:51

## 2025-08-05 RX ADMIN — ONDANSETRON 4 MG: 2 INJECTION, SOLUTION INTRAMUSCULAR; INTRAVENOUS at 11:35

## 2025-08-05 RX ADMIN — FAMOTIDINE 20 MG: 10 INJECTION, SOLUTION INTRAVENOUS at 10:27

## 2025-08-05 RX ADMIN — ROCURONIUM BROMIDE 50 MG: 10 INJECTION, SOLUTION INTRAVENOUS at 10:54

## 2025-08-05 RX ADMIN — DEXAMETHASONE SODIUM PHOSPHATE 8 MG: 4 INJECTION, SOLUTION INTRAMUSCULAR; INTRAVENOUS at 11:00

## 2025-08-05 RX ADMIN — LIDOCAINE HYDROCHLORIDE 80 MG: 20 INJECTION, SOLUTION INFILTRATION; PERINEURAL at 10:54

## 2025-08-05 RX ADMIN — PROPOFOL 150 MG: 10 INJECTION, EMULSION INTRAVENOUS at 10:54

## 2025-08-28 ENCOUNTER — HOSPITAL ENCOUNTER (OUTPATIENT)
Age: 85
Setting detail: HOSPITAL OUTPATIENT SURGERY
Discharge: HOME | End: 2025-08-28
Attending: OPHTHALMOLOGY | Admitting: OPHTHALMOLOGY
Payer: MEDICARE

## 2025-08-28 ENCOUNTER — ANESTHESIA EVENT (OUTPATIENT)
Age: 85
End: 2025-08-28
Payer: MEDICARE

## 2025-08-28 ENCOUNTER — ANESTHESIA (OUTPATIENT)
Age: 85
End: 2025-08-28
Payer: MEDICARE

## 2025-08-28 VITALS
HEART RATE: 82 BPM | TEMPERATURE: 97.7 F | BODY MASS INDEX: 25.43 KG/M2 | SYSTOLIC BLOOD PRESSURE: 135 MMHG | WEIGHT: 177.6 LBS | OXYGEN SATURATION: 97 % | HEIGHT: 70 IN | DIASTOLIC BLOOD PRESSURE: 83 MMHG | RESPIRATION RATE: 16 BRPM

## 2025-08-28 PROCEDURE — 25010000002 DEXAMETHASONE SODIUM PHOSPHATE 20 MG/5ML SOLUTION: Performed by: STUDENT IN AN ORGANIZED HEALTH CARE EDUCATION/TRAINING PROGRAM

## 2025-08-28 PROCEDURE — 25010000002 FENTANYL CITRATE (PF) 50 MCG/ML SOLUTION: Performed by: STUDENT IN AN ORGANIZED HEALTH CARE EDUCATION/TRAINING PROGRAM

## 2025-08-28 PROCEDURE — 25010000002 SUGAMMADEX 200 MG/2ML SOLUTION: Performed by: STUDENT IN AN ORGANIZED HEALTH CARE EDUCATION/TRAINING PROGRAM

## 2025-08-28 PROCEDURE — 25010000002 CEFAZOLIN PER 500 MG: Performed by: OPHTHALMOLOGY

## 2025-08-28 PROCEDURE — 25010000002 LIDOCAINE-EPINEPHRINE (PF) 2 %-1:200000 SOLUTION 20 ML VIAL: Performed by: OPHTHALMOLOGY

## 2025-08-28 PROCEDURE — 25810000003 LACTATED RINGERS PER 1000 ML: Performed by: STUDENT IN AN ORGANIZED HEALTH CARE EDUCATION/TRAINING PROGRAM

## 2025-08-28 PROCEDURE — 25010000002 BUPIVACAINE (PF) 0.5 % SOLUTION 10 ML VIAL: Performed by: OPHTHALMOLOGY

## 2025-08-28 PROCEDURE — 25010000002 LIDOCAINE 2% SOLUTION: Performed by: STUDENT IN AN ORGANIZED HEALTH CARE EDUCATION/TRAINING PROGRAM

## 2025-08-28 PROCEDURE — 25010000002 PROPOFOL 10 MG/ML EMULSION: Performed by: STUDENT IN AN ORGANIZED HEALTH CARE EDUCATION/TRAINING PROGRAM

## 2025-08-28 PROCEDURE — 25010000002 ONDANSETRON PER 1 MG: Performed by: STUDENT IN AN ORGANIZED HEALTH CARE EDUCATION/TRAINING PROGRAM

## 2025-08-28 RX ORDER — DEXAMETHASONE SODIUM PHOSPHATE 4 MG/ML
INJECTION, SOLUTION INTRA-ARTICULAR; INTRALESIONAL; INTRAMUSCULAR; INTRAVENOUS; SOFT TISSUE AS NEEDED
Status: DISCONTINUED | OUTPATIENT
Start: 2025-08-28 | End: 2025-08-28 | Stop reason: SURG

## 2025-08-28 RX ORDER — DROPERIDOL 2.5 MG/ML
0.62 INJECTION, SOLUTION INTRAMUSCULAR; INTRAVENOUS
Status: DISCONTINUED | OUTPATIENT
Start: 2025-08-28 | End: 2025-08-28 | Stop reason: HOSPADM

## 2025-08-28 RX ORDER — FENTANYL CITRATE 50 UG/ML
25 INJECTION, SOLUTION INTRAMUSCULAR; INTRAVENOUS
Status: DISCONTINUED | OUTPATIENT
Start: 2025-08-28 | End: 2025-08-28 | Stop reason: HOSPADM

## 2025-08-28 RX ORDER — LABETALOL HYDROCHLORIDE 5 MG/ML
5 INJECTION, SOLUTION INTRAVENOUS
Status: DISCONTINUED | OUTPATIENT
Start: 2025-08-28 | End: 2025-08-28 | Stop reason: HOSPADM

## 2025-08-28 RX ORDER — ONDANSETRON 2 MG/ML
INJECTION INTRAMUSCULAR; INTRAVENOUS AS NEEDED
Status: DISCONTINUED | OUTPATIENT
Start: 2025-08-28 | End: 2025-08-28 | Stop reason: SURG

## 2025-08-28 RX ORDER — DIPHENHYDRAMINE HYDROCHLORIDE 50 MG/ML
12.5 INJECTION, SOLUTION INTRAMUSCULAR; INTRAVENOUS
Status: DISCONTINUED | OUTPATIENT
Start: 2025-08-28 | End: 2025-08-28 | Stop reason: HOSPADM

## 2025-08-28 RX ORDER — SODIUM CHLORIDE 9 MG/ML
40 INJECTION, SOLUTION INTRAVENOUS AS NEEDED
Status: DISCONTINUED | OUTPATIENT
Start: 2025-08-28 | End: 2025-08-28 | Stop reason: HOSPADM

## 2025-08-28 RX ORDER — HYDRALAZINE HYDROCHLORIDE 20 MG/ML
5 INJECTION INTRAMUSCULAR; INTRAVENOUS
Status: DISCONTINUED | OUTPATIENT
Start: 2025-08-28 | End: 2025-08-28 | Stop reason: HOSPADM

## 2025-08-28 RX ORDER — HYDROMORPHONE HYDROCHLORIDE 1 MG/ML
0.25 INJECTION, SOLUTION INTRAMUSCULAR; INTRAVENOUS; SUBCUTANEOUS
Refills: 0 | Status: DISCONTINUED | OUTPATIENT
Start: 2025-08-28 | End: 2025-08-28 | Stop reason: HOSPADM

## 2025-08-28 RX ORDER — MIDAZOLAM HYDROCHLORIDE 1 MG/ML
1 INJECTION, SOLUTION INTRAMUSCULAR; INTRAVENOUS
Status: DISCONTINUED | OUTPATIENT
Start: 2025-08-28 | End: 2025-08-28 | Stop reason: HOSPADM

## 2025-08-28 RX ORDER — HYDROCODONE BITARTRATE AND ACETAMINOPHEN 7.5; 325 MG/1; MG/1
1 TABLET ORAL EVERY 4 HOURS PRN
Refills: 0 | Status: DISCONTINUED | OUTPATIENT
Start: 2025-08-28 | End: 2025-08-28 | Stop reason: HOSPADM

## 2025-08-28 RX ORDER — HYDROCODONE BITARTRATE AND ACETAMINOPHEN 5; 325 MG/1; MG/1
1 TABLET ORAL ONCE AS NEEDED
Refills: 0 | Status: DISCONTINUED | OUTPATIENT
Start: 2025-08-28 | End: 2025-08-28 | Stop reason: HOSPADM

## 2025-08-28 RX ORDER — ATROPINE SULFATE 0.4 MG/ML
0.4 INJECTION, SOLUTION INTRAMUSCULAR; INTRAVENOUS; SUBCUTANEOUS ONCE AS NEEDED
Status: DISCONTINUED | OUTPATIENT
Start: 2025-08-28 | End: 2025-08-28 | Stop reason: HOSPADM

## 2025-08-28 RX ORDER — PROMETHAZINE HYDROCHLORIDE 25 MG/1
25 SUPPOSITORY RECTAL ONCE AS NEEDED
Status: DISCONTINUED | OUTPATIENT
Start: 2025-08-28 | End: 2025-08-28 | Stop reason: HOSPADM

## 2025-08-28 RX ORDER — HYDROCODONE BITARTRATE AND ACETAMINOPHEN 5; 325 MG/1; MG/1
1 TABLET ORAL EVERY 6 HOURS PRN
Qty: 15 TABLET | Refills: 0 | Status: SHIPPED | OUTPATIENT
Start: 2025-08-28 | End: 2025-08-31

## 2025-08-28 RX ORDER — ROCURONIUM BROMIDE 10 MG/ML
INJECTION, SOLUTION INTRAVENOUS AS NEEDED
Status: DISCONTINUED | OUTPATIENT
Start: 2025-08-28 | End: 2025-08-28 | Stop reason: SURG

## 2025-08-28 RX ORDER — FENTANYL CITRATE 50 UG/ML
INJECTION, SOLUTION INTRAMUSCULAR; INTRAVENOUS AS NEEDED
Status: DISCONTINUED | OUTPATIENT
Start: 2025-08-28 | End: 2025-08-28 | Stop reason: SURG

## 2025-08-28 RX ORDER — SODIUM CHLORIDE, SODIUM LACTATE, POTASSIUM CHLORIDE, CALCIUM CHLORIDE 600; 310; 30; 20 MG/100ML; MG/100ML; MG/100ML; MG/100ML
INJECTION, SOLUTION INTRAVENOUS CONTINUOUS PRN
Status: DISCONTINUED | OUTPATIENT
Start: 2025-08-28 | End: 2025-08-28 | Stop reason: SURG

## 2025-08-28 RX ORDER — SODIUM CHLORIDE, SODIUM LACTATE, POTASSIUM CHLORIDE, CALCIUM CHLORIDE 600; 310; 30; 20 MG/100ML; MG/100ML; MG/100ML; MG/100ML
9 INJECTION, SOLUTION INTRAVENOUS CONTINUOUS PRN
Status: DISCONTINUED | OUTPATIENT
Start: 2025-08-28 | End: 2025-08-28 | Stop reason: HOSPADM

## 2025-08-28 RX ORDER — NEOMYCIN POLYMYXIN B SULFATES AND DEXAMETHASONE 3.5; 10000; 1 MG/ML; [USP'U]/ML; MG/ML
1 SUSPENSION/ DROPS OPHTHALMIC 3 TIMES DAILY
Qty: 5 ML | Refills: 0 | Status: SHIPPED | OUTPATIENT
Start: 2025-08-28

## 2025-08-28 RX ORDER — ONDANSETRON 2 MG/ML
4 INJECTION INTRAMUSCULAR; INTRAVENOUS ONCE AS NEEDED
Status: DISCONTINUED | OUTPATIENT
Start: 2025-08-28 | End: 2025-08-28 | Stop reason: HOSPADM

## 2025-08-28 RX ORDER — NALOXONE HCL 0.4 MG/ML
0.2 VIAL (ML) INJECTION AS NEEDED
Status: DISCONTINUED | OUTPATIENT
Start: 2025-08-28 | End: 2025-08-28 | Stop reason: HOSPADM

## 2025-08-28 RX ORDER — PROMETHAZINE HYDROCHLORIDE 12.5 MG/1
25 TABLET ORAL ONCE AS NEEDED
Status: DISCONTINUED | OUTPATIENT
Start: 2025-08-28 | End: 2025-08-28 | Stop reason: HOSPADM

## 2025-08-28 RX ORDER — FLUMAZENIL 0.1 MG/ML
0.2 INJECTION INTRAVENOUS AS NEEDED
Status: DISCONTINUED | OUTPATIENT
Start: 2025-08-28 | End: 2025-08-28 | Stop reason: HOSPADM

## 2025-08-28 RX ORDER — SODIUM CHLORIDE 0.9 % (FLUSH) 0.9 %
10 SYRINGE (ML) INJECTION EVERY 12 HOURS SCHEDULED
Status: DISCONTINUED | OUTPATIENT
Start: 2025-08-28 | End: 2025-08-28 | Stop reason: HOSPADM

## 2025-08-28 RX ORDER — ERYTHROMYCIN 5 MG/G
OINTMENT OPHTHALMIC 2 TIMES DAILY
Qty: 3.5 G | Refills: 1 | Status: SHIPPED | OUTPATIENT
Start: 2025-08-28

## 2025-08-28 RX ORDER — PROPOFOL 10 MG/ML
VIAL (ML) INTRAVENOUS AS NEEDED
Status: DISCONTINUED | OUTPATIENT
Start: 2025-08-28 | End: 2025-08-28 | Stop reason: SURG

## 2025-08-28 RX ORDER — ERYTHROMYCIN 5 MG/G
OINTMENT OPHTHALMIC AS NEEDED
Status: DISCONTINUED | OUTPATIENT
Start: 2025-08-28 | End: 2025-08-28 | Stop reason: HOSPADM

## 2025-08-28 RX ORDER — BUPIVACAINE HCL/0.9 % NACL/PF 0.125 %
PLASTIC BAG, INJECTION (ML) EPIDURAL AS NEEDED
Status: DISCONTINUED | OUTPATIENT
Start: 2025-08-28 | End: 2025-08-28 | Stop reason: SURG

## 2025-08-28 RX ORDER — LIDOCAINE HYDROCHLORIDE 20 MG/ML
INJECTION, SOLUTION INFILTRATION; PERINEURAL AS NEEDED
Status: DISCONTINUED | OUTPATIENT
Start: 2025-08-28 | End: 2025-08-28 | Stop reason: SURG

## 2025-08-28 RX ORDER — SODIUM CHLORIDE 0.9 % (FLUSH) 0.9 %
10 SYRINGE (ML) INJECTION AS NEEDED
Status: DISCONTINUED | OUTPATIENT
Start: 2025-08-28 | End: 2025-08-28 | Stop reason: HOSPADM

## 2025-08-28 RX ADMIN — SODIUM CHLORIDE, SODIUM LACTATE, POTASSIUM CHLORIDE, AND CALCIUM CHLORIDE: .6; .31; .03; .02 INJECTION, SOLUTION INTRAVENOUS at 10:40

## 2025-08-28 RX ADMIN — ROCURONIUM BROMIDE 20 MG: 10 INJECTION, SOLUTION INTRAVENOUS at 10:54

## 2025-08-28 RX ADMIN — Medication 200 MCG: at 11:21

## 2025-08-28 RX ADMIN — LIDOCAINE HYDROCHLORIDE 80 MG: 20 INJECTION, SOLUTION INFILTRATION; PERINEURAL at 10:43

## 2025-08-28 RX ADMIN — PROPOFOL 50 MG: 10 INJECTION, EMULSION INTRAVENOUS at 10:48

## 2025-08-28 RX ADMIN — SODIUM CHLORIDE, SODIUM LACTATE, POTASSIUM CHLORIDE, AND CALCIUM CHLORIDE 9 ML/HR: .6; .31; .03; .02 INJECTION, SOLUTION INTRAVENOUS at 08:59

## 2025-08-28 RX ADMIN — PROPOFOL 70 MG: 10 INJECTION, EMULSION INTRAVENOUS at 10:54

## 2025-08-28 RX ADMIN — ONDANSETRON 4 MG: 2 INJECTION INTRAMUSCULAR; INTRAVENOUS at 10:59

## 2025-08-28 RX ADMIN — PROPOFOL 80 MG: 10 INJECTION, EMULSION INTRAVENOUS at 10:43

## 2025-08-28 RX ADMIN — DEXAMETHASONE SODIUM PHOSPHATE 8 MG: 4 INJECTION, SOLUTION INTRAMUSCULAR; INTRAVENOUS at 10:59

## 2025-08-28 RX ADMIN — CEFAZOLIN 2000 MG: 2 INJECTION, POWDER, FOR SOLUTION INTRAMUSCULAR; INTRAVENOUS at 10:38

## 2025-08-28 RX ADMIN — SUGAMMADEX 200 MG: 100 INJECTION, SOLUTION INTRAVENOUS at 11:23

## 2025-08-28 RX ADMIN — PROPOFOL 175 MCG/KG/MIN: 10 INJECTION, EMULSION INTRAVENOUS at 10:43

## 2025-08-28 RX ADMIN — FENTANYL CITRATE 50 MCG: 50 INJECTION, SOLUTION INTRAMUSCULAR; INTRAVENOUS at 11:11

## (undated) DEVICE — SPONGE,NEURO,0.5"X3",XR,STRL,LF,10/PK: Brand: MEDLINE

## (undated) DEVICE — SOL ISO/ALC RUB 70PCT 4OZ

## (undated) DEVICE — GLV SURG SENSICARE PI MIC PF SZ7 LF STRL

## (undated) DEVICE — MARKER,SKIN,WI/RULER AND LABELS: Brand: MEDLINE

## (undated) DEVICE — SPNG LAP 18X18IN LF STRL PK/5

## (undated) DEVICE — CONTAINER,SPECIMEN,OR STERILE,4OZ: Brand: MEDLINE

## (undated) DEVICE — SUT GUT PLN FAST ABS 5/0 PC1 18IN 1915G

## (undated) DEVICE — MAT FLR ABSORBENT LG 4FT 10 2.5FT

## (undated) DEVICE — GLV SURG SENSICARE PI LF PF 8 GRN STRL

## (undated) DEVICE — 4-PORT MANIFOLD: Brand: NEPTUNE 2

## (undated) DEVICE — SUT MNCRYL 2/0 CT1 36IN

## (undated) DEVICE — GLV SURG SENSICARE PI MIC PF SZ6.5 LF STRL

## (undated) DEVICE — SUT VIC 6/0 P3 18IN J492G

## (undated) DEVICE — SHEET, ORTHO, SPLIT, STERILE: Brand: MEDLINE

## (undated) DEVICE — GLV SURG BIOGEL SENSR LTX PF SZ7.5

## (undated) DEVICE — ADHS LIQ MASTISOL 2/3ML

## (undated) DEVICE — TRAP FLD MINIVAC MEGADYNE 100ML

## (undated) DEVICE — DRSNG SLVR/ANTIBAC PRIMASEAL POST/OP ADHS 3.5X10IN

## (undated) DEVICE — SYR LL TP 10ML STRL

## (undated) DEVICE — APPL CHLORAPREP HI/LITE 26ML ORNG

## (undated) DEVICE — PATIENT RETURN ELECTRODE, SINGLE-USE, CONTACT QUALITY MONITORING, ADULT, WITH 9FT CORD, FOR PATIENTS WEIGING OVER 33LBS. (15KG): Brand: MEGADYNE

## (undated) DEVICE — TRAP FLD MEGADYNE

## (undated) DEVICE — SPNG GZ WOVN 4X4IN 12PLY 10/BX STRL

## (undated) DEVICE — NDL BLNT 18G 1 1/2IN

## (undated) DEVICE — 3M™ IOBAN™ 2 ANTIMICROBIAL INCISE DRAPE 6651EZ: Brand: IOBAN™ 2

## (undated) DEVICE — INTENDED FOR TISSUE SEPARATION, AND OTHER PROCEDURES THAT REQUIRE A SHARP SURGICAL BLADE TO PUNCTURE OR CUT.: Brand: BARD-PARKER ® STAINLESS STEEL BLADES

## (undated) DEVICE — GLV SURG SENSICARE PI LF PF 7.5

## (undated) DEVICE — PREP SOL POVIDONE/IODINE BT 4OZ

## (undated) DEVICE — GOWN,PREVENTION PLUS,XLNG/XXLARGE,STRL: Brand: MEDLINE

## (undated) DEVICE — PK ANT HIP 40

## (undated) DEVICE — 1010 S-DRAPE TOWEL DRAPE 10/BX: Brand: STERI-DRAPE™

## (undated) DEVICE — STERILE COTTON TIP 6IN 10PK: Brand: MEDLINE

## (undated) DEVICE — DECANT BG O JET

## (undated) DEVICE — HOOD, PEEL-AWAY: Brand: FLYTE

## (undated) DEVICE — DRAPE,U/ SHT,SPLIT,PLAS,STERIL: Brand: MEDLINE

## (undated) DEVICE — BRECKENRIDGE ENT: Brand: MEDLINE INDUSTRIES, INC.

## (undated) DEVICE — GOWN,SIRUS,NON REINFRCD,LARGE,SET IN SL: Brand: MEDLINE

## (undated) DEVICE — PAD,EYE,1-5/8X2 5/8,STERILE,LF,1/PK: Brand: MEDLINE

## (undated) DEVICE — CVR HNDL LT SURG ACCSSRY BLU STRL

## (undated) DEVICE — ELECTRD BLD EZ CLN STD 6.5IN

## (undated) DEVICE — HOOD: Brand: FLYTE

## (undated) DEVICE — 3M™ STERI-DRAPE™ INSTRUMENT POUCH 1018: Brand: STERI-DRAPE™

## (undated) DEVICE — BOWL PLSTC LG 32OZ BLU STRL

## (undated) DEVICE — TOWEL,OR,DSP,ST,BLUE,STD,4/PK,20PK/CS: Brand: MEDLINE

## (undated) DEVICE — 1016 S-DRAPE IRRIG POUCH 10/BOX: Brand: STERI-DRAPE™

## (undated) DEVICE — COVER,MAYO STAND,XL,STERILE: Brand: MEDLINE

## (undated) DEVICE — SHLD PR W/SXN/CUP 22X21MM MD ORNG STRL

## (undated) DEVICE — NDL HYPO PRECISIONGLIDE REG 25G 1 1/2

## (undated) DEVICE — SUT ETHIB NO 5 MB46G

## (undated) DEVICE — TBG PENCL TELESCP MEGADYNE SMOKE EVAC 10FT

## (undated) DEVICE — STRIP,CLOSURE,WOUND,MEDI-STRIP,1/2X4: Brand: MEDLINE

## (undated) DEVICE — ELECTRD NDL EZ CLN MOD 2.75IN

## (undated) DEVICE — PAD,NON-ADHERENT,3X4,STERILE,LF,1/PK: Brand: CURAD

## (undated) DEVICE — DECANTER: Brand: UNBRANDED

## (undated) DEVICE — SUT VIC 5/0 P3 18IN J493G

## (undated) DEVICE — IRRIGATOR BULB ASEPTO 60CC STRL

## (undated) DEVICE — GLV SURG SENSICARE POLYISPRN W/ALOE PF LF 6.5 GRN STRL

## (undated) DEVICE — GOWN,PREVENTION PLUS,XLARGE,STERILE: Brand: MEDLINE

## (undated) DEVICE — DUAL CUT SAGITTAL BLADE

## (undated) DEVICE — THE STERILE LIGHT HANDLE COVER IS USED WITH STERIS SURGICAL LIGHTING AND VISUALIZATION SYSTEMS.

## (undated) DEVICE — POSITIONER,HEAD,MULTIRING,36CS: Brand: MEDLINE